# Patient Record
Sex: FEMALE | Race: WHITE | Employment: FULL TIME | URBAN - NONMETROPOLITAN AREA
[De-identification: names, ages, dates, MRNs, and addresses within clinical notes are randomized per-mention and may not be internally consistent; named-entity substitution may affect disease eponyms.]

---

## 2017-06-22 ENCOUNTER — OFFICE VISIT (OUTPATIENT)
Dept: FAMILY MEDICINE CLINIC | Age: 52
End: 2017-06-22

## 2017-06-22 VITALS
RESPIRATION RATE: 16 BRPM | WEIGHT: 119 LBS | SYSTOLIC BLOOD PRESSURE: 100 MMHG | HEIGHT: 66 IN | DIASTOLIC BLOOD PRESSURE: 70 MMHG | BODY MASS INDEX: 19.13 KG/M2 | HEART RATE: 72 BPM

## 2017-06-22 DIAGNOSIS — R53.83 FATIGUE, UNSPECIFIED TYPE: Primary | ICD-10-CM

## 2017-06-22 DIAGNOSIS — R63.4 WEIGHT LOSS: ICD-10-CM

## 2017-06-22 DIAGNOSIS — F41.9 ANXIETY: ICD-10-CM

## 2017-06-22 DIAGNOSIS — R53.1 WEAKNESS: ICD-10-CM

## 2017-06-22 DIAGNOSIS — H93.13 TINNITUS, BILATERAL: ICD-10-CM

## 2017-06-22 PROCEDURE — 99213 OFFICE O/P EST LOW 20 MIN: CPT | Performed by: FAMILY MEDICINE

## 2017-06-22 ASSESSMENT — PATIENT HEALTH QUESTIONNAIRE - PHQ9
SUM OF ALL RESPONSES TO PHQ QUESTIONS 1-9: 1
2. FEELING DOWN, DEPRESSED OR HOPELESS: 1
1. LITTLE INTEREST OR PLEASURE IN DOING THINGS: 0
SUM OF ALL RESPONSES TO PHQ9 QUESTIONS 1 & 2: 1

## 2017-06-25 ASSESSMENT — ENCOUNTER SYMPTOMS
DIARRHEA: 0
ABDOMINAL PAIN: 0
TROUBLE SWALLOWING: 0
CONSTIPATION: 0
SORE THROAT: 0
VOMITING: 0
WHEEZING: 0
EYE REDNESS: 0
RHINORRHEA: 0
SINUS PRESSURE: 0
COUGH: 0
EYE DISCHARGE: 0
NAUSEA: 0
SHORTNESS OF BREATH: 0

## 2017-12-12 ENCOUNTER — TELEPHONE (OUTPATIENT)
Dept: FAMILY MEDICINE CLINIC | Age: 52
End: 2017-12-12

## 2020-05-29 ENCOUNTER — OFFICE VISIT (OUTPATIENT)
Dept: FAMILY MEDICINE CLINIC | Age: 55
End: 2020-05-29
Payer: COMMERCIAL

## 2020-05-29 VITALS
SYSTOLIC BLOOD PRESSURE: 104 MMHG | DIASTOLIC BLOOD PRESSURE: 70 MMHG | HEIGHT: 66 IN | TEMPERATURE: 97.2 F | BODY MASS INDEX: 20.25 KG/M2 | WEIGHT: 126 LBS | RESPIRATION RATE: 18 BRPM | HEART RATE: 76 BPM | OXYGEN SATURATION: 98 %

## 2020-05-29 PROCEDURE — 99396 PREV VISIT EST AGE 40-64: CPT | Performed by: FAMILY MEDICINE

## 2020-05-29 RX ORDER — CLINDAMYCIN HYDROCHLORIDE 300 MG/1
CAPSULE ORAL
Qty: 18 CAPSULE | Refills: 0 | Status: SHIPPED | OUTPATIENT
Start: 2020-05-29 | End: 2022-09-13

## 2020-05-29 ASSESSMENT — ENCOUNTER SYMPTOMS
WHEEZING: 0
TROUBLE SWALLOWING: 0
DIARRHEA: 0
VOMITING: 0
ABDOMINAL PAIN: 0
EYE DISCHARGE: 0
RHINORRHEA: 0
EYE REDNESS: 0
SINUS PRESSURE: 0
SHORTNESS OF BREATH: 0
SORE THROAT: 0
CONSTIPATION: 0
NAUSEA: 0
COUGH: 0

## 2020-05-29 ASSESSMENT — PATIENT HEALTH QUESTIONNAIRE - PHQ9
SUM OF ALL RESPONSES TO PHQ QUESTIONS 1-9: 0
SUM OF ALL RESPONSES TO PHQ QUESTIONS 1-9: 0
2. FEELING DOWN, DEPRESSED OR HOPELESS: 0
SUM OF ALL RESPONSES TO PHQ9 QUESTIONS 1 & 2: 0
1. LITTLE INTEREST OR PLEASURE IN DOING THINGS: 0

## 2020-05-29 NOTE — PROGRESS NOTES
Platelets 811 132 - 733 k/uL    MPV 9.1 6.0 - 12.0 fL    Differential Type NOT REPORTED     WBC Morphology NOT REPORTED     RBC Morphology NOT REPORTED     Platelet Estimate NOT REPORTED     Seg Neutrophils 67 %    Lymphocytes 21 %    Monocytes 8 %    Eosinophils % 3 %    Basophils 1 %    Segs Absolute 3.90 1.8 - 7.7 k/uL    Absolute Lymph # 1.20 1.0 - 4.8 k/uL    Absolute Mono # 0.50 0.1 - 1.2 k/uL    Absolute Eos # 0.20 0.0 - 0.4 k/uL    Basophils Absolute 0.00 0.0 - 0.2 k/uL   TSH without Reflex   Result Value Ref Range    TSH 2.93 0.30 - 5.00 mIU/L   T4, Free   Result Value Ref Range    Thyroxine, Free 1.11 0.93 - 1.70 ng/dL   Comprehensive Metabolic Panel   Result Value Ref Range    Glucose 93 70 - 99 mg/dL    BUN 13 6 - 20 mg/dL    CREATININE 0.83 0.50 - 0.90 mg/dL    Bun/Cre Ratio 16 9 - 20    Calcium 9.9 8.6 - 10.4 mg/dL    Sodium 144 135 - 144 mmol/L    Potassium 4.5 3.7 - 5.3 mmol/L    Chloride 103 98 - 107 mmol/L    CO2 29 20 - 31 mmol/L    Anion Gap 12 9 - 17 mmol/L    Alkaline Phosphatase 75 35 - 104 U/L    ALT 14 5 - 33 U/L    AST 22 <32 U/L    Total Bilirubin 0.52 0.3 - 1.2 mg/dL    Total Protein 7.6 6.4 - 8.3 g/dL    Alb 4.9 3.5 - 5.2 g/dL    Albumin/Globulin Ratio 1.8 1.0 - 2.5    GFR Non-African American >60 >60 mL/min    GFR African American >60 >60 mL/min    GFR Comment          GFR Staging NOT REPORTED    Vitamin B12 & Folate   Result Value Ref Range    Vitamin B-12 732 211 - 946 pg/mL    Folate >20.0 >4.8 ng/mL   Ferritin   Result Value Ref Range    Ferritin 40 13 - 150 ug/L   Iron And TIBC   Result Value Ref Range    Iron 173 (H) 37 - 145 ug/dL    TIBC 379 250 - 450 ug/dL    Iron Saturation 46 20 - 55 %    UIBC 206 112 - 347 ug/dL      Other review of systems are as noted below. Preventative measures are reviewed today. See health maintenance section for complete preventative plan of care.     Did review patient's med list, allergies, social history, fam history, pmhx and pshx today as noted kg).    Physical Exam  Vitals signs and nursing note reviewed. Constitutional:       General: She is not in acute distress. Appearance: Normal appearance. She is well-developed. She is not diaphoretic. HENT:      Head: Normocephalic and atraumatic. Right Ear: Tympanic membrane, ear canal and external ear normal.      Left Ear: Tympanic membrane, ear canal and external ear normal.      Nose: Nose normal.      Mouth/Throat:      Mouth: Mucous membranes are moist.      Pharynx: Oropharynx is clear. No oropharyngeal exudate. Eyes:      General:         Right eye: No discharge. Left eye: No discharge. Conjunctiva/sclera: Conjunctivae normal.      Pupils: Pupils are equal, round, and reactive to light. Neck:      Musculoskeletal: Normal range of motion and neck supple. Thyroid: No thyromegaly. Cardiovascular:      Rate and Rhythm: Normal rate and regular rhythm. Heart sounds: Normal heart sounds. Pulmonary:      Effort: Pulmonary effort is normal.      Breath sounds: Normal breath sounds. No wheezing or rales. Abdominal:      General: Bowel sounds are normal. There is no distension. Palpations: Abdomen is soft. Tenderness: There is no abdominal tenderness. Lymphadenopathy:      Cervical: No cervical adenopathy. Skin:     General: Skin is warm and dry. Findings: No rash. Neurological:      Mental Status: She is alert and oriented to person, place, and time. Psychiatric:         Behavior: Behavior normal.         Thought Content: Thought content normal.         Judgment: Judgment normal.         No flowsheet data found.     Lab Results   Component Value Date    GLUCOSE 93 06/22/2017       The ASCVD Risk score (Nitish Multani., et al., 2013) failed to calculate for the following reasons:    Cannot find a previous HDL lab    Cannot find a previous total cholesterol lab    Immunization History   Administered Date(s) Administered    Influenza, Quadv, Recombinant,

## 2021-09-16 ENCOUNTER — TELEPHONE (OUTPATIENT)
Dept: FAMILY MEDICINE CLINIC | Age: 56
End: 2021-09-16

## 2021-09-16 NOTE — TELEPHONE ENCOUNTER
----- Message from Ritchie Amador sent at 9/16/2021 12:55 PM EDT -----  Subject: Message to Provider    QUESTIONS  Information for Provider? patient of Dr. Adrianna Angeles is calling to   request for her pcp to give her a call, she has a lot of questions and   needs to badly speak with her if possible to give her a call asap.  ---------------------------------------------------------------------------  --------------  [address]  What is the best way for the office to contact you? OK to leave message on   voicemail  Preferred Call Back Phone Number? [phone]  ---------------------------------------------------------------------------  --------------  SCRIPT ANSWERS  Relationship to Patient?  Self

## 2021-09-20 ENCOUNTER — OFFICE VISIT (OUTPATIENT)
Dept: FAMILY MEDICINE CLINIC | Age: 56
End: 2021-09-20
Payer: COMMERCIAL

## 2021-09-20 VITALS
HEIGHT: 66 IN | BODY MASS INDEX: 19.69 KG/M2 | TEMPERATURE: 96.2 F | WEIGHT: 122.5 LBS | SYSTOLIC BLOOD PRESSURE: 102 MMHG | DIASTOLIC BLOOD PRESSURE: 60 MMHG | HEART RATE: 87 BPM | OXYGEN SATURATION: 98 % | RESPIRATION RATE: 14 BRPM

## 2021-09-20 DIAGNOSIS — Z00.00 ROUTINE GENERAL MEDICAL EXAMINATION AT A HEALTH CARE FACILITY: ICD-10-CM

## 2021-09-20 DIAGNOSIS — U07.1 COVID-19: Primary | ICD-10-CM

## 2021-09-20 PROCEDURE — 99214 OFFICE O/P EST MOD 30 MIN: CPT | Performed by: FAMILY MEDICINE

## 2021-09-20 SDOH — ECONOMIC STABILITY: FOOD INSECURITY: WITHIN THE PAST 12 MONTHS, YOU WORRIED THAT YOUR FOOD WOULD RUN OUT BEFORE YOU GOT MONEY TO BUY MORE.: NEVER TRUE

## 2021-09-20 SDOH — ECONOMIC STABILITY: FOOD INSECURITY: WITHIN THE PAST 12 MONTHS, THE FOOD YOU BOUGHT JUST DIDN'T LAST AND YOU DIDN'T HAVE MONEY TO GET MORE.: NEVER TRUE

## 2021-09-20 ASSESSMENT — ENCOUNTER SYMPTOMS
ABDOMINAL PAIN: 0
VOMITING: 0
TROUBLE SWALLOWING: 0
NAUSEA: 0
COUGH: 0
SINUS PRESSURE: 0
EYE REDNESS: 0
CONSTIPATION: 0
EYE DISCHARGE: 0
WHEEZING: 0
SORE THROAT: 0
SHORTNESS OF BREATH: 0
DIARRHEA: 0
RHINORRHEA: 0

## 2021-09-20 ASSESSMENT — SOCIAL DETERMINANTS OF HEALTH (SDOH): HOW HARD IS IT FOR YOU TO PAY FOR THE VERY BASICS LIKE FOOD, HOUSING, MEDICAL CARE, AND HEATING?: NOT HARD AT ALL

## 2021-09-20 ASSESSMENT — PATIENT HEALTH QUESTIONNAIRE - PHQ9: DEPRESSION UNABLE TO ASSESS: PT REFUSES

## 2021-09-20 NOTE — PROGRESS NOTES
2021     Bennie Galvan (:  1965) is a 60 y.o. female, here for evaluation of the following medical concerns:    HPI    Patient comes in today for assessment after acute COVID-19 infection. Patient did test positive for COVID-19 on . She does work as a patient care tech in the hospital setting. She did get Regeneron infusion and did have significant improvement in her symptoms overall. She was having significant generalized body aches and fatigue and congestion. After getting the infusion her symptoms improved significantly. She does state that she is just not sure she is ready go back to work. She does still get tired throughout the day and has a very demanding job. We did talk about the stress of her job duties and she is not sure if she wants to continue in the healthcare field. I did discuss with her considering different options and she is considering her options at this time. Patient otherwise has no other acute issues. I did make her aware that is been a while since she has had any routine screening lab work so may want to get updated lab work for screening purposes and she is agreeable with this plan. Did review patient's med list, allergies, social history, fam history, pmhx and pshx today as noted in the record. Review of Systems   Constitutional: Positive for fatigue (intermittent since acute covid). Negative for chills and fever. HENT: Negative for congestion, ear pain, postnasal drip, rhinorrhea, sinus pressure, sore throat and trouble swallowing. Eyes: Negative for discharge and redness. Respiratory: Negative for cough, shortness of breath and wheezing. Cardiovascular: Negative for chest pain. Gastrointestinal: Negative for abdominal pain, constipation, diarrhea, nausea and vomiting. Genitourinary: Negative for dysuria, flank pain, frequency and urgency. Musculoskeletal: Negative for arthralgias, myalgias and neck pain.    Skin: Negative for rash and wound. Allergic/Immunologic: Negative for environmental allergies. Neurological: Negative for dizziness, weakness, light-headedness and headaches. Hematological: Negative for adenopathy. Psychiatric/Behavioral: Negative. Prior to Visit Medications    Medication Sig Taking? Authorizing Provider   Cyanocobalamin (VITAMIN B-12 PO) Take by mouth daily  Historical Provider, MD   clindamycin (CLEOCIN) 300 MG capsule Take 2 tabs 30 minutes prior to dental procedure. Then after dental extraction take 1 pill bid for 1 week  Aneta Alcocer,         Social History     Tobacco Use    Smoking status: Never Smoker    Smokeless tobacco: Never Used   Substance Use Topics    Alcohol use: No        There were no vitals filed for this visit. Estimated body mass index is 20.34 kg/m² as calculated from the following:    Height as of 5/29/20: 5' 6\" (1.676 m). Weight as of 5/29/20: 126 lb (57.2 kg). Physical Exam  Vitals and nursing note reviewed. Constitutional:       General: She is not in acute distress. Appearance: Normal appearance. She is well-developed. She is not diaphoretic. HENT:      Head: Normocephalic and atraumatic. Right Ear: Tympanic membrane, ear canal and external ear normal.      Left Ear: Tympanic membrane, ear canal and external ear normal.      Nose: Nose normal.      Mouth/Throat:      Mouth: Mucous membranes are moist.      Pharynx: Oropharynx is clear. No oropharyngeal exudate. Eyes:      General:         Right eye: No discharge. Left eye: No discharge. Conjunctiva/sclera: Conjunctivae normal.      Pupils: Pupils are equal, round, and reactive to light. Neck:      Thyroid: No thyromegaly. Cardiovascular:      Rate and Rhythm: Normal rate and regular rhythm. Heart sounds: Normal heart sounds. Pulmonary:      Effort: Pulmonary effort is normal.      Breath sounds: Normal breath sounds. No wheezing or rales.    Abdominal:      General: Bowel sounds are normal. There is no distension. Palpations: Abdomen is soft. Tenderness: There is no abdominal tenderness. Musculoskeletal:      Cervical back: Normal range of motion and neck supple. Lymphadenopathy:      Cervical: No cervical adenopathy. Skin:     General: Skin is warm and dry. Findings: No rash. Neurological:      Mental Status: She is alert and oriented to person, place, and time. Psychiatric:         Behavior: Behavior normal.         Thought Content: Thought content normal.         Judgment: Judgment normal.         ASSESSMENT/PLAN:  Encounter Diagnoses   Name Primary?  COVID-19 Yes    Routine general medical examination at a health care facility      No orders of the defined types were placed in this encounter. Orders Placed This Encounter   Procedures    Comprehensive Metabolic Panel     Standing Status:   Future     Standing Expiration Date:   9/20/2022    Lipid Panel     Standing Status:   Future     Standing Expiration Date:   9/20/2022     Order Specific Question:   Is Patient Fasting?/# of Hours     Answer:   12    CBC Auto Differential     Standing Status:   Future     Standing Expiration Date:   9/20/2022    TSH without Reflex     Standing Status:   Future     Standing Expiration Date:   9/20/2022     Did give her a work note to be off work for an additional week duration. This will allow for her acute infectious symptoms to improve. Should get plenty of rest and maintain adequate fluid hydration. Patient does follow healthy dietary intake. Patient is to get routine screening lab work as ordered. Will make further recommendations based on testing reports. Patient is to return to my office annually for routine general physical exam or sooner if any further problems or symptoms arise.     (Please note that portions of this note were completed with a voice recognition program. Efforts were made to edit the dictations but occasionally words are mis-transcribed.)        No follow-ups on file. An electronic signature was used to authenticate this note.     --Adrianna Angeles DO on 9/20/2021 at 7:37 AM

## 2021-09-20 NOTE — LETTER
[facility] department of [place] [address]  Phone: [phone]  Fax: Mick Macias,       September 20, 2021    Patient:   Lanette Montgomery  Date of Birth   1965  Date of visit   9/20/2021        To Whom it May Concern:      Lanette Montgomery was seen in my clinic on 9/20/2021. Please excuse from work 9/20/21-9/26/21 due to acute medical illness. May return to regular work duties on 9/27/21. If you have any questions or concerns, please don't hesitate to call.       Sincerely,      Ambar Bowie DO

## 2021-10-28 ENCOUNTER — TELEPHONE (OUTPATIENT)
Dept: FAMILY MEDICINE CLINIC | Age: 56
End: 2021-10-28

## 2021-10-28 NOTE — TELEPHONE ENCOUNTER
----- Message from Misha So sent at 10/28/2021  9:03 AM EDT -----  Subject: Appointment Request    Reason for Call: Routine Existing Condition Follow Up    QUESTIONS  Type of Appointment? Established Patient  Reason for appointment request? No appointments available during search  Additional Information for Provider? Patient called to reschedule   appointment. patient is available Nov 15-Nov 17. Patient is also request   lab for covid- antibiotic levels. Patient is not able to see   grand-children until tested. Patient is also stating she can also be   available anytime to come  ---------------------------------------------------------------------------  --------------  [facility]  What is the best way for the office to contact you? OK to leave message on   voicemail  Preferred Call Back Phone Number? [phone]  ---------------------------------------------------------------------------  --------------  SCRIPT ANSWERS  Relationship to Patient? Self  Have your symptoms changed? No  Have you been diagnosed with, awaiting test results for, or told that you   are suspected of having COVID-19 (Coronavirus)? (If patient has tested   negative or was tested as a requirement for work, school, or travel and   not based on symptoms, answer no)? No  Within the past two weeks have you developed any of the following symptoms   (answer no if symptoms have been present longer than 2 weeks or began   more than 2 weeks ago)? Fever or Chills, Cough, Shortness of breath or   difficulty breathing, Loss of taste or smell, Sore throat, Nasal   congestion, Sneezing or runny nose, Fatigue or generalized body aches   (answer no if pain is specific to a body part e.g. back pain), Diarrhea,   Headache? No  Have you had close contact with someone with COVID-19 in the last 14 days? No  (Service Expert  click yes below to proceed with [email or web address] As Usual   Scheduling)?  Yes

## 2021-11-11 ENCOUNTER — TELEPHONE (OUTPATIENT)
Dept: FAMILY MEDICINE CLINIC | Age: 56
End: 2021-11-11

## 2021-11-11 DIAGNOSIS — Z78.9 UNKNOWN STATUS OF IMMUNITY TO COVID-19 VIRUS: Primary | ICD-10-CM

## 2021-11-11 NOTE — TELEPHONE ENCOUNTER
----- Message from Festus Trevino sent at 11/11/2021  9:16 AM EST -----  Subject: Message to Provider    QUESTIONS  Information for Provider? Pt called she she had covid infusion on Sept 11th and she is asking on Dec 11th withh be the 90th day for covid   infusion and she is wanting to know if she has to get her levels checked,   and which vaccine would be better for her please follow up   ---------------------------------------------------------------------------  --------------  [address]  What is the best way for the office to contact you? OK to leave message on   voicemail  Preferred Call Back Phone Number? [phone]  ---------------------------------------------------------------------------  --------------  SCRIPT ANSWERS  Relationship to Patient?  Self

## 2021-11-17 ENCOUNTER — TELEPHONE (OUTPATIENT)
Dept: FAMILY MEDICINE CLINIC | Age: 56
End: 2021-11-17

## 2021-12-09 ENCOUNTER — TELEPHONE (OUTPATIENT)
Dept: FAMILY MEDICINE CLINIC | Age: 56
End: 2021-12-09

## 2021-12-09 NOTE — TELEPHONE ENCOUNTER
No, doesn't necessarily need to retest unless she is curious if she still has antibodies. Would get the vaccine either way.

## 2021-12-09 NOTE — TELEPHONE ENCOUNTER
Pt calling stating she had the antibody test and tested positive for Igg due to pt have pt having had the infusion, pt questions if she needs to be retested before getting the covid vaccine, once it has been 90 days, please advise.

## 2021-12-17 ENCOUNTER — TELEPHONE (OUTPATIENT)
Dept: FAMILY MEDICINE CLINIC | Age: 56
End: 2021-12-17

## 2021-12-17 NOTE — TELEPHONE ENCOUNTER
Spoke with patient and reminded her to complete fasting labs that were ordered 09/20/2021. Patient states she is not going to complete all of them as she can not fast 12 hours. Advised patient to fast as long as she was able and that would be ok. She states I am not going to do that because my labs will be off then and then you will document in my medical record I have things wrong that I do not! She states she is willing to do the TSH and CBC and will do them at [place] to mail her the orders.

## 2022-01-26 ENCOUNTER — TELEPHONE (OUTPATIENT)
Dept: FAMILY MEDICINE CLINIC | Age: 57
End: 2022-01-26

## 2022-03-01 ENCOUNTER — OFFICE VISIT (OUTPATIENT)
Dept: FAMILY MEDICINE CLINIC | Age: 57
End: 2022-03-01
Payer: COMMERCIAL

## 2022-03-01 ENCOUNTER — HOSPITAL ENCOUNTER (OUTPATIENT)
Dept: LAB | Age: 57
Discharge: HOME | End: 2022-03-01
Payer: COMMERCIAL

## 2022-03-01 VITALS
HEIGHT: 66 IN | OXYGEN SATURATION: 97 % | WEIGHT: 122 LBS | BODY MASS INDEX: 19.61 KG/M2 | RESPIRATION RATE: 12 BRPM | HEART RATE: 68 BPM | SYSTOLIC BLOOD PRESSURE: 114 MMHG | DIASTOLIC BLOOD PRESSURE: 78 MMHG

## 2022-03-01 DIAGNOSIS — Z78.9 UNKNOWN STATUS OF IMMUNITY TO COVID-19 VIRUS: ICD-10-CM

## 2022-03-01 DIAGNOSIS — T50.B95A ADVERSE REACTION TO COVID-19 VACCINE: ICD-10-CM

## 2022-03-01 DIAGNOSIS — Z00.00 ROUTINE GENERAL MEDICAL EXAMINATION AT A HEALTH CARE FACILITY: ICD-10-CM

## 2022-03-01 DIAGNOSIS — Z00.00 ROUTINE GENERAL MEDICAL EXAMINATION AT A HEALTH CARE FACILITY: Primary | ICD-10-CM

## 2022-03-01 LAB
ABSOLUTE EOS #: 0.33 K/UL (ref 0–0.44)
ABSOLUTE IMMATURE GRANULOCYTE: <0.03 K/UL (ref 0–0.3)
ABSOLUTE LYMPH #: 2.07 K/UL (ref 1.1–3.7)
ABSOLUTE MONO #: 0.48 K/UL (ref 0.1–1.2)
ALBUMIN SERPL-MCNC: 4.5 G/DL (ref 3.5–5.2)
ALBUMIN/GLOBULIN RATIO: 1.6 (ref 1–2.5)
ALP BLD-CCNC: 89 U/L (ref 35–104)
ALT SERPL-CCNC: 16 U/L (ref 5–33)
ANION GAP SERPL CALCULATED.3IONS-SCNC: 8 MMOL/L (ref 9–17)
AST SERPL-CCNC: 23 U/L
BASOPHILS # BLD: 1 % (ref 0–2)
BASOPHILS ABSOLUTE: 0.04 K/UL (ref 0–0.2)
BILIRUB SERPL-MCNC: 0.35 MG/DL (ref 0.3–1.2)
BUN BLDV-MCNC: 14 MG/DL (ref 6–20)
BUN/CREAT BLD: 15 (ref 9–20)
CALCIUM SERPL-MCNC: 9.9 MG/DL (ref 8.6–10.4)
CHLORIDE BLD-SCNC: 103 MMOL/L (ref 98–107)
CO2: 31 MMOL/L (ref 20–31)
CREAT SERPL-MCNC: 0.91 MG/DL (ref 0.5–0.9)
EOSINOPHILS RELATIVE PERCENT: 7 % (ref 1–4)
GFR AFRICAN AMERICAN: >60 ML/MIN
GFR NON-AFRICAN AMERICAN: >60 ML/MIN
GFR SERPL CREATININE-BSD FRML MDRD: ABNORMAL ML/MIN/{1.73_M2}
GLUCOSE BLD-MCNC: 94 MG/DL (ref 70–99)
HCT VFR BLD CALC: 39.8 % (ref 36.3–47.1)
HEMOGLOBIN: 13.1 G/DL (ref 11.9–15.1)
IMMATURE GRANULOCYTES: 0 %
LYMPHOCYTES # BLD: 44 % (ref 24–43)
MCH RBC QN AUTO: 31.4 PG (ref 25.2–33.5)
MCHC RBC AUTO-ENTMCNC: 32.9 G/DL (ref 25.2–33.5)
MCV RBC AUTO: 95.4 FL (ref 82.6–102.9)
MONOCYTES # BLD: 10 % (ref 3–12)
NRBC AUTOMATED: 0 PER 100 WBC
PDW BLD-RTO: 11.5 % (ref 11.8–14.4)
PLATELET # BLD: 192 K/UL (ref 138–453)
PMV BLD AUTO: 11.3 FL (ref 8.1–13.5)
POTASSIUM SERPL-SCNC: 3.9 MMOL/L (ref 3.7–5.3)
RBC # BLD: 4.17 M/UL (ref 3.95–5.11)
SARS-COV-2 ANTIBODY, TOTAL: POSITIVE
SEG NEUTROPHILS: 38 % (ref 36–65)
SEGMENTED NEUTROPHILS ABSOLUTE COUNT: 1.78 K/UL (ref 1.5–8.1)
SODIUM BLD-SCNC: 142 MMOL/L (ref 135–144)
TOTAL PROTEIN: 7.3 G/DL (ref 6.4–8.3)
WBC # BLD: 4.7 K/UL (ref 3.5–11.3)

## 2022-03-01 PROCEDURE — 86769 SARS-COV-2 COVID-19 ANTIBODY: CPT

## 2022-03-01 PROCEDURE — 99396 PREV VISIT EST AGE 40-64: CPT | Performed by: FAMILY MEDICINE

## 2022-03-01 PROCEDURE — 36415 COLL VENOUS BLD VENIPUNCTURE: CPT

## 2022-03-01 PROCEDURE — 99211 OFF/OP EST MAY X REQ PHY/QHP: CPT | Performed by: FAMILY MEDICINE

## 2022-03-01 PROCEDURE — 80053 COMPREHEN METABOLIC PANEL: CPT

## 2022-03-01 PROCEDURE — 85025 COMPLETE CBC W/AUTO DIFF WBC: CPT

## 2022-03-01 ASSESSMENT — ENCOUNTER SYMPTOMS
EYE REDNESS: 0
SINUS PRESSURE: 0
SORE THROAT: 0
EYE DISCHARGE: 0
NAUSEA: 0
TROUBLE SWALLOWING: 0
COUGH: 0
RHINORRHEA: 0
VOMITING: 0
WHEEZING: 0
CONSTIPATION: 0
DIARRHEA: 0
SHORTNESS OF BREATH: 0
ABDOMINAL PAIN: 0

## 2022-03-01 ASSESSMENT — PATIENT HEALTH QUESTIONNAIRE - PHQ9
SUM OF ALL RESPONSES TO PHQ QUESTIONS 1-9: 1
SUM OF ALL RESPONSES TO PHQ QUESTIONS 1-9: 1
SUM OF ALL RESPONSES TO PHQ9 QUESTIONS 1 & 2: 1
1. LITTLE INTEREST OR PLEASURE IN DOING THINGS: 0
SUM OF ALL RESPONSES TO PHQ QUESTIONS 1-9: 1
SUM OF ALL RESPONSES TO PHQ QUESTIONS 1-9: 1
2. FEELING DOWN, DEPRESSED OR HOPELESS: 1

## 2022-03-01 NOTE — PROGRESS NOTES
3/1/2022     Kiesha Carrasco (:  1965) is a 60 y.o. female, here for evaluation of the following medical concerns:    HPI  Patient comes in today for routine general physical exam and to discuss reaction to the Covid vaccine. Patient states that she did receive her first COVID vaccine and did have a very severe headache afterwards. She states since getting her COVID vaccine she has had this recurrent headache in the middle of her forehead that feels like an active splitting her head into. She gets this at least once a week. She is concerned about getting her second dose of the vaccine as she is afraid this is going to worsen. She did have COVID last fall. Did state that her workplace would need a written note for exemption of getting the second vaccine. She also notes that she has been having floaters in her right eye and she was concerned about that. She has not had any loss of vision or significant visual change but periodically notes something floating in her vision. Otherwise with regards to her overall health she is not having any acute medical concerns. She is due for some updated screening lab work which we can order at this time. Does try to follow healthy diet and stays physically active. No other acute medical concerns at this time. Did review patient's med list, allergies, social history, fam history, pmhx and pshx today as noted in the record. Review of Systems   Constitutional: Negative for chills, fatigue and fever. HENT: Negative for congestion, ear pain, postnasal drip, rhinorrhea, sinus pressure, sore throat and trouble swallowing. Eyes: Positive for visual disturbance (floaters in vision). Negative for discharge and redness. Respiratory: Negative for cough, shortness of breath and wheezing. Cardiovascular: Negative for chest pain. Gastrointestinal: Negative for abdominal pain, constipation, diarrhea, nausea and vomiting.    Genitourinary: Negative for dysuria, flank pain, frequency and urgency. Musculoskeletal: Negative for arthralgias, myalgias and neck pain. Skin: Negative for rash and wound. Allergic/Immunologic: Negative for environmental allergies. Neurological: Positive for headaches. Negative for dizziness, weakness and light-headedness. Hematological: Negative for adenopathy. Psychiatric/Behavioral: Negative. Prior to Visit Medications    Medication Sig Taking? Authorizing Provider   Cyanocobalamin (VITAMIN B-12 PO) Take by mouth daily  Patient not taking: Reported on 9/20/2021  Historical Provider, MD   clindamycin (CLEOCIN) 300 MG capsule Take 2 tabs 30 minutes prior to dental procedure. Then after dental extraction take 1 pill bid for 1 week  John Rodrigez, DO        Social History     Tobacco Use    Smoking status: Never Smoker    Smokeless tobacco: Never Used   Substance Use Topics    Alcohol use: No        There were no vitals filed for this visit. Estimated body mass index is 19.77 kg/m² as calculated from the following:    Height as of 9/20/21: 5' 6\" (1.676 m). Weight as of 9/20/21: 122 lb 8 oz (55.6 kg). Physical Exam  Vitals and nursing note reviewed. Constitutional:       General: She is not in acute distress. Appearance: Normal appearance. She is well-developed. She is not diaphoretic. HENT:      Head: Normocephalic and atraumatic. Right Ear: Tympanic membrane, ear canal and external ear normal.      Left Ear: Tympanic membrane, ear canal and external ear normal.      Nose: Nose normal.      Mouth/Throat:      Mouth: Mucous membranes are moist.      Pharynx: Oropharynx is clear. No oropharyngeal exudate. Eyes:      General:         Right eye: No discharge. Left eye: No discharge. Conjunctiva/sclera: Conjunctivae normal.      Pupils: Pupils are equal, round, and reactive to light. Comments: Attempted to do fundoscopic evaluation which was limited due to non dilated pupils.   Do not appreciate any acute abnormality   Neck:      Thyroid: No thyromegaly. Cardiovascular:      Rate and Rhythm: Normal rate and regular rhythm. Heart sounds: Normal heart sounds. Pulmonary:      Effort: Pulmonary effort is normal.      Breath sounds: Normal breath sounds. No wheezing or rales. Abdominal:      General: Bowel sounds are normal. There is no distension. Palpations: Abdomen is soft. Tenderness: There is no abdominal tenderness. Musculoskeletal:      Cervical back: Normal range of motion and neck supple. Lymphadenopathy:      Cervical: No cervical adenopathy. Skin:     General: Skin is warm and dry. Findings: No rash. Neurological:      Mental Status: She is alert and oriented to person, place, and time. Psychiatric:         Behavior: Behavior normal.         Thought Content: Thought content normal.         Judgment: Judgment normal.         ASSESSMENT/PLAN:    Encounter Diagnoses   Name Primary?  Routine general medical examination at a health care facility Yes    Adverse reaction to COVID-19 vaccine     Unknown status of immunity to COVID-19 virus      No orders of the defined types were placed in this encounter. Orders Placed This Encounter   Procedures    Comprehensive Metabolic Panel     Standing Status:   Future     Standing Expiration Date:   3/1/2023    CBC with Auto Differential     Standing Status:   Future     Standing Expiration Date:   3/1/2023    Lipid Panel     Standing Status:   Future     Standing Expiration Date:   3/1/2023     Order Specific Question:   Is Patient Fasting?/# of Hours     Answer:   12    Covid-19, Antibody, Total     Standing Status:   Future     Standing Expiration Date:   3/1/2023     Scheduling Instructions:      CDC does not recommend using antibody testing to diagnose acute infection. It is recommended to use a direct viral detection test to diagnose acute infection.  (COVID-19 [facility] [id number])            Antibody tests are not 100% accurate, and some false-positive results or false-negative results may occur. A positive result may not ensure immunity from reinfection. Per CDC, positive or negative results do not confirm whether a patient is able to spread the virus that causes COVID-19     Order Specific Question:   Symptomatic for COVID-19 as defined by CDC? Answer:   No     Order Specific Question:   Date of Symptom Onset     Answer:   N/A     Order Specific Question:   Hospitalized for COVID-19? Answer:   No     Order Specific Question:   Admitted to ICU for COVID-19? Answer:   No     Order Specific Question:   Employed in healthcare setting? Answer:   No     Order Specific Question:   Resident in a congregate (group) care setting? Answer:   No     Order Specific Question:   Pregnant? Answer:   No     Order Specific Question:   Previously tested for COVID-19? Answer:   Yes     Did write patient and exemption for her COVID vaccine second dose as she did have significant reaction to the first dose. Patient is to have lab work done as ordered. Will make further recommendations based on testing reports. Patient is to continue to follow healthy dietary intake and routine exercise for optimal health. Patient is to return to my office annually for routine general physical exam or sooner if any further problems or symptoms arise. (Please note that portions of this note were completed with a voice recognition program. Efforts were made to edit the dictations but occasionally words are mis-transcribed.)      No follow-ups on file. An electronic signature was used to authenticate this note.     --Dameon Christy, DO on 3/1/2022 at 7:21 AM

## 2022-03-01 NOTE — PATIENT INSTRUCTIONS
Appointment on 06/22/2017   Component Date Value Ref Range Status    WBC 06/22/2017 5.8  3.5 - 11.0 k/uL Final    RBC 06/22/2017 4.62  4.0 - 5.2 m/uL Final    Hemoglobin 06/22/2017 14.1  12.0 - 16.0 g/dL Final    Hematocrit 06/22/2017 43.5  36 - 46 % Final    MCV 06/22/2017 94.1  80 - 100 fL Final    MCH 06/22/2017 30.5  26 - 34 pg Final    MCHC 06/22/2017 32.4  31 - 37 g/dL Final    RDW 06/22/2017 12.7  11.0 - 14.5 % Final    Platelets 77/08/3955 214  140 - 450 k/uL Final    MPV 06/22/2017 9.1  6.0 - 12.0 fL Final    Differential Type 06/22/2017 NOT REPORTED   Final    WBC Morphology 06/22/2017 NOT REPORTED   Final    RBC Morphology 06/22/2017 NOT REPORTED   Final    Platelet Estimate 20/38/6884 NOT REPORTED   Final    Seg Neutrophils 06/22/2017 67  % Final    Lymphocytes 06/22/2017 21  % Final    Monocytes 06/22/2017 8  % Final    Eosinophils % 06/22/2017 3  % Final    Basophils 06/22/2017 1  % Final    Segs Absolute 06/22/2017 3.90  1.8 - 7.7 k/uL Final    Absolute Lymph # 06/22/2017 1.20  1.0 - 4.8 k/uL Final    Absolute Mono # 06/22/2017 0.50  0.1 - 1.2 k/uL Final    Absolute Eos # 06/22/2017 0.20  0.0 - 0.4 k/uL Final    Basophils Absolute 06/22/2017 0.00  0.0 - 0.2 k/uL Final    Comment: Performed at [facility] [address], [address]   [phone]      TSH 06/22/2017 2.93  0.30 - 5.00 mIU/L Final    Comment: Performed at [facility] [address], [address]   [phone]      Thyroxine, Free 06/22/2017 1.11  0.93 - 1.70 ng/dL Final    Comment: Performed at [facility] [address], [address]   [phone]      Glucose 06/22/2017 93  70 - 99 mg/dL Final    BUN 06/22/2017 13  6 - 20 mg/dL Final    CREATININE 06/22/2017 0.83  0.50 - 0.90 mg/dL Final    Bun/Cre Ratio 06/22/2017 16  9 - 20 Final    Calcium 06/22/2017 9.9  8.6 - 10.4 mg/dL Final    Sodium 06/22/2017 144  135 - 144 mmol/L Final    Potassium 06/22/2017 4.5  3.7 - 5.3 mmol/L Final    Chloride 06/22/2017 103  98 - 107 mmol/L Final    CO2 06/22/2017 29  20 - 31 mmol/L Final    Anion Gap 06/22/2017 12  9 - 17 mmol/L Final    Alkaline Phosphatase 06/22/2017 75  35 - 104 U/L Final    ALT 06/22/2017 14  5 - 33 U/L Final    AST 06/22/2017 22  <32 U/L Final    Total Bilirubin 06/22/2017 0.52  0.3 - 1.2 mg/dL Final    Total Protein 06/22/2017 7.6  6.4 - 8.3 g/dL Final    Albumin 06/22/2017 4.9  3.5 - 5.2 g/dL Final    Albumin/Globulin Ratio 06/22/2017 1.8  1.0 - 2.5 Final    GFR Non- 06/22/2017 >60  >60 mL/min Final    GFR  06/22/2017 >60  >60 mL/min Final    GFR Comment 06/22/2017        Final    Comment: Average GFR for 52-63 years old:   80 mL/min/1.73sq m  Chronic Kidney Disease:   <60 mL/min/1.73sq m  Kidney failure:   <15 mL/min/1.73sq m              eGFR calculated using average adult body mass. Additional eGFR calculator   available at:        [employer]        Performed at [facility] [address], [address]   [phone]      GFR Staging 06/22/2017 NOT REPORTED   Final    Vitamin B-12 06/22/2017 732  211 - 946 pg/mL Final    Folate 06/22/2017 >20.0  >4.8 ng/mL Final    Performed at [facility], [address] [place] [address] [phone]    Ferritin 06/22/2017 40  13 - 150 ug/L Final    Comment: Performed at [facility] [address], [address]   [phone]      Iron 06/22/2017 173* 37 - 145 ug/dL Final    TIBC 06/22/2017 379  250 - 450 ug/dL Final    Iron Saturation 06/22/2017 46  20 - 55 % Final    UIBC 06/22/2017 206  112 - 347 ug/dL Final    Comment: Performed at [facility] [address], [address]   [phone]. [facility]

## 2022-03-01 NOTE — PROGRESS NOTES
Patient declines HIV and Hep C screenings. She declines the shingles and tetanus vaccines today. She would like to discuss the 2nd Covid vaccine with the doctor today due to side effects with the 1st vaccine. She declines a mammogram and colonoscopy today.

## 2022-03-01 NOTE — LETTER
[facility] department of [place] [address]  Phone: [phone]  Fax: DO Love        2022      To Whom It May Concern,    I am writing with regards to my patient Safia Daigle ( 1965). Due to a reaction to her initial COVID vaccination, I do recommend that she avoid further COVID vaccines. Please allow her to be exempt from this requirement. Should you have any further questions or concerns, please feel free to contact my office.       Sincerely,        Nilson Miranda DO

## 2022-06-23 ENCOUNTER — HOSPITAL ENCOUNTER (OUTPATIENT)
Age: 57
Setting detail: SPECIMEN
Discharge: HOME | End: 2022-06-23
Payer: COMMERCIAL

## 2022-06-23 ENCOUNTER — OFFICE VISIT (OUTPATIENT)
Dept: PRIMARY CARE CLINIC | Age: 57
End: 2022-06-23
Payer: COMMERCIAL

## 2022-06-23 VITALS
HEIGHT: 66 IN | WEIGHT: 120 LBS | SYSTOLIC BLOOD PRESSURE: 100 MMHG | HEART RATE: 79 BPM | DIASTOLIC BLOOD PRESSURE: 64 MMHG | TEMPERATURE: 97.9 F | OXYGEN SATURATION: 98 % | BODY MASS INDEX: 19.29 KG/M2

## 2022-06-23 DIAGNOSIS — R30.9 PAINFUL URINATION: ICD-10-CM

## 2022-06-23 DIAGNOSIS — N89.8 VAGINAL DISCHARGE: ICD-10-CM

## 2022-06-23 DIAGNOSIS — R30.9 PAINFUL URINATION: Primary | ICD-10-CM

## 2022-06-23 LAB
-: NORMAL
BACTERIA: NORMAL
BILIRUBIN URINE: NEGATIVE
EPITHELIAL CELLS UA: NORMAL /HPF (ref 0–5)
GLUCOSE URINE: NEGATIVE
KETONES, URINE: NEGATIVE
LEUKOCYTE ESTERASE, URINE: ABNORMAL
NITRITE, URINE: NEGATIVE
PH UA: 5.5 (ref 5–6)
PROTEIN UA: NEGATIVE
RBC UA: NORMAL /HPF (ref 0–4)
SPECIFIC GRAVITY UA: 1 (ref 1.01–1.02)
URINE HGB: ABNORMAL
UROBILINOGEN, URINE: NORMAL
WBC UA: NORMAL /HPF (ref 0–4)

## 2022-06-23 PROCEDURE — 87660 TRICHOMONAS VAGIN DIR PROBE: CPT

## 2022-06-23 PROCEDURE — 81001 URINALYSIS AUTO W/SCOPE: CPT

## 2022-06-23 PROCEDURE — 87591 N.GONORRHOEAE DNA AMP PROB: CPT

## 2022-06-23 PROCEDURE — 87480 CANDIDA DNA DIR PROBE: CPT

## 2022-06-23 PROCEDURE — 87510 GARDNER VAG DNA DIR PROBE: CPT

## 2022-06-23 PROCEDURE — 87491 CHLMYD TRACH DNA AMP PROBE: CPT

## 2022-06-23 PROCEDURE — 99213 OFFICE O/P EST LOW 20 MIN: CPT | Performed by: NURSE PRACTITIONER

## 2022-06-23 ASSESSMENT — ENCOUNTER SYMPTOMS
VOMITING: 0
NAUSEA: 0
RESPIRATORY NEGATIVE: 1

## 2022-06-23 ASSESSMENT — PATIENT HEALTH QUESTIONNAIRE - PHQ9
SUM OF ALL RESPONSES TO PHQ QUESTIONS 1-9: 0
2. FEELING DOWN, DEPRESSED OR HOPELESS: 0
SUM OF ALL RESPONSES TO PHQ QUESTIONS 1-9: 0
SUM OF ALL RESPONSES TO PHQ9 QUESTIONS 1 & 2: 0
SUM OF ALL RESPONSES TO PHQ QUESTIONS 1-9: 0
1. LITTLE INTEREST OR PLEASURE IN DOING THINGS: 0
SUM OF ALL RESPONSES TO PHQ QUESTIONS 1-9: 0

## 2022-06-23 NOTE — PROGRESS NOTES
[facility]             [address], [address]                        Telephone [phone]             Fax [phone]     Meenu Hartmann  1965  OHI:[id number]   Date of visit:  6/23/2022    Subjective:    Meenu Hartmann is a 59 y.o.  female who presents to [facility] today (6/23/2022) for evaluation of:    Chief Complaint   Patient presents with    Urinary Frequency     urgency,watery with blood tinged discharge,burning. No pap smear for 25 yrs. sx began 3 days ago. Urinary Frequency   This is a new problem. The current episode started in the past 7 days (06/20/22). The problem occurs every urination. The problem has been gradually worsening. The quality of the pain is described as burning. The pain is at a severity of 5/10. There has been no fever. She is not sexually active. There is no history of pyelonephritis. Associated symptoms include a discharge (pink watery discharge X 3 days), frequency and urgency. Pertinent negatives include no flank pain, hematuria, nausea, possible pregnancy or vomiting. She has tried nothing for the symptoms. The treatment provided no relief. History of hysterectomy. She has the following problem list:  There is no problem list on file for this patient. Current medications are:  Current Outpatient Medications   Medication Sig Dispense Refill    Multiple Vitamins-Minerals (THRIVE FOR LIFE WOMENS PO) Take by mouth      Cyanocobalamin (VITAMIN B-12 PO) Take by mouth daily       clindamycin (CLEOCIN) 300 MG capsule Take 2 tabs 30 minutes prior to dental procedure. Then after dental extraction take 1 pill bid for 1 week (Patient not taking: Reported on 3/1/2022) 18 capsule 0     No current facility-administered medications for this visit.         She is allergic to aciphex [rabeprazole sodium], biaxin [clarithromycin], buspirone, nizatidine, sudafed [pseudoephedrine], sumatriptan, amoxicillin, amoxicillin-pot clavulanate, and ciprofloxacin. .    She  reports that she has never smoked. She has never used smokeless tobacco.      Objective:    Vitals:    06/23/22 1438   BP: 100/64   Pulse: 79   Temp: 97.9 °F (36.6 °C)   TempSrc: Tympanic   SpO2: 98%   Weight: 120 lb (54.4 kg)   Height: 5' 6\" (1.676 m)     Body mass index is 19.37 kg/m². Review of Systems   Constitutional: Negative. Respiratory: Negative. Cardiovascular: Negative. Gastrointestinal: Negative for nausea and vomiting. Genitourinary: Positive for dysuria, frequency, urgency and vaginal discharge (pink, watery). Negative for flank pain, hematuria and pelvic pain. Physical Exam  Vitals and nursing note reviewed. Exam conducted with a chaperone present (Radha ERAZO). Constitutional:       Appearance: She is well-developed. HENT:      Head: Normocephalic. Eyes:      Pupils: Pupils are equal, round, and reactive to light. Cardiovascular:      Rate and Rhythm: Normal rate and regular rhythm. Heart sounds: Normal heart sounds. Pulmonary:      Effort: Pulmonary effort is normal.      Breath sounds: Normal breath sounds and air entry. Abdominal:      General: Abdomen is flat. Bowel sounds are normal.      Palpations: Abdomen is soft. Tenderness: There is no abdominal tenderness. Genitourinary:     Exam position: Supine. Vagina: Vaginal discharge (thin yellow), erythema and tenderness (mild with insertion of speculum) present. Uterus: Absent. Adnexa: Right adnexa normal and left adnexa normal.      Rectum: Normal.   Musculoskeletal:      Cervical back: Normal range of motion and neck supple. Skin:     General: Skin is warm and dry. Neurological:      Mental Status: She is alert and oriented to person, place, and time. Psychiatric:         Behavior: Behavior normal.         Thought Content:  Thought content normal.       Assessment and Plan:    Hospital Outpatient Visit on 06/23/2022   Component Date Value Ref Range Status    Glucose, Ur 06/23/2022 NEGATIVE  NEGATIVE Final    Bilirubin Urine 06/23/2022 NEGATIVE  NEGATIVE Final    Ketones, Urine 06/23/2022 NEGATIVE  NEGATIVE Final    Specific Gravity, UA 06/23/2022 1.005* 1.010 - 1.025 Final    Urine Hgb 06/23/2022 TRACE* NEGATIVE Final    pH, UA 06/23/2022 5.5  5.0 - 6.0 Final    Protein, UA 06/23/2022 NEGATIVE  NEGATIVE Final    Urobilinogen, Urine 06/23/2022 Normal  Normal Final    Nitrite, Urine 06/23/2022 NEGATIVE  NEGATIVE Final    Leukocyte Esterase, Urine 06/23/2022 1+* NEGATIVE Final    - 06/23/2022        Final    WBC, UA 06/23/2022 0 TO 4  0 - 4 /HPF Final    RBC, UA 06/23/2022 0 TO 4  0 - 4 /HPF Final    Epithelial Cells UA 06/23/2022 0 TO 4  0 - 5 /HPF Final    Bacteria, UA 06/23/2022 None  None Final          Diagnosis Orders   1. Painful urination  Urinalysis with Reflex to Culture    Vaginitis DNA Probe    C.trachomatis N.gonorrhoeae DNA   2. Vaginal discharge  Vaginitis DNA Probe    C.trachomatis N.gonorrhoeae DNA     I reviewed UA result with patient during visit. We will call with lab results and treat if indicated. I recommended to follow up with GYN if symptoms persist or worsen. The use, risks, benefits, and side effects of prescribed or recommended medications were discussed. All questions were answered and the patient/caregiver voiced understanding. No orders of the defined types were placed in this encounter.         Electronically signed by LYNNE Luther CNP on 6/23/22 at 2:44 PM EDT

## 2022-06-23 NOTE — PATIENT INSTRUCTIONS
Patient Education        Painful Urination (Dysuria): Care Instructions  Your Care Instructions  Burning pain with urination (dysuria) is a common symptom of a urinary tract infection or other urinary problems. The bladder may become inflamed. This can cause pain when the bladder fills and empties. You may also feel pain if the tube that carries urine from the bladder to the outside of the body (urethra)gets irritated or infected. Sexually transmitted infections (STIs) also may cause pain when you urinate. Sometimes the pain can be caused by things other than an infection. The urethra can be irritated by soaps, perfumes, or foreign objects in the urethra. Jasmin Marcel can cause pain when they pass through the urethra. The cause may be hard to find. You may need tests. Treatment for painfulurination depends on the cause. Follow-up care is a key part of your treatment and safety. Be sure to make and go to all appointments, and call your doctor if you are having problems. It's also a good idea to know your test results and keep alist of the medicines you take. How can you care for yourself at home?  Drink extra water for the next day or two. This will help make the urine less concentrated. (If you have kidney, heart, or liver disease and have to limit fluids, talk with your doctor before you increase the amount of fluids you drink.)   Avoid drinks that are carbonated or have caffeine. They can irritate the bladder.  Urinate often. Try to empty your bladder each time. For women:   Urinate right after you have sex.  After going to the bathroom, wipe from front to back.  Avoid douches, bubble baths, and feminine hygiene sprays. And avoid other feminine hygiene products that have deodorants. When should you call for help? Call your doctor now or seek immediate medical care if:     You have new symptoms, such as fever, nausea, or vomiting.      You have new or worse symptoms of a urinary problem.  For example:  ? You have blood or pus in your urine. ? You have chills or body aches. ? It hurts worse to urinate. ? You have groin or belly pain. ? You have pain in your back just below your rib cage (the flank area). Watch closely for changes in your health, and be sure to contact your doctor ifyou have any problems. Where can you learn more? Go to https://[employer]Acceptd. org and sign in to your [employer] account. Enter V409 in the [employer] box to learn more about \"Painful Urination (Dysuria): Care Instructions. \"     If you do not have an account, please click on the \"Sign Up Now\" link. Current as of: October 18, 2021               Content Version: 13.3  © 4922-7073 Healthwise, Incorporated. Care instructions adapted under license by [facility] If you have questions about a medical condition or this instruction, always ask your healthcare professional. [place] [address] any warranty or liability for your use of this information.

## 2022-06-24 LAB
C TRACH DNA GENITAL QL NAA+PROBE: NEGATIVE
CANDIDA SPECIES, DNA PROBE: NEGATIVE
GARDNERELLA VAGINALIS, DNA PROBE: NEGATIVE
N. GONORRHOEAE DNA: NEGATIVE
SOURCE: NORMAL
SPECIMEN DESCRIPTION: NORMAL
TRICHOMONAS VAGINALIS DNA: NEGATIVE

## 2022-08-19 ENCOUNTER — OFFICE VISIT (OUTPATIENT)
Dept: PRIMARY CARE CLINIC | Age: 57
End: 2022-08-19
Payer: COMMERCIAL

## 2022-08-19 VITALS
HEART RATE: 72 BPM | BODY MASS INDEX: 19.29 KG/M2 | SYSTOLIC BLOOD PRESSURE: 90 MMHG | WEIGHT: 120 LBS | HEIGHT: 66 IN | TEMPERATURE: 98.1 F | DIASTOLIC BLOOD PRESSURE: 60 MMHG | OXYGEN SATURATION: 98 %

## 2022-08-19 DIAGNOSIS — U07.1 COVID-19: Primary | ICD-10-CM

## 2022-08-19 PROCEDURE — 99213 OFFICE O/P EST LOW 20 MIN: CPT | Performed by: FAMILY MEDICINE

## 2022-08-19 ASSESSMENT — ENCOUNTER SYMPTOMS
EYE DISCHARGE: 0
RHINORRHEA: 0
VOMITING: 0
SINUS PRESSURE: 0
TROUBLE SWALLOWING: 0
EYE REDNESS: 0
WHEEZING: 0
ABDOMINAL PAIN: 0
COUGH: 0
SHORTNESS OF BREATH: 0
SINUS PAIN: 0
DIARRHEA: 0
NAUSEA: 0
CONSTIPATION: 0
SORE THROAT: 0

## 2022-08-19 NOTE — LETTER
[facility] A department of [facility] [address]  Phone: [phone]  Fax: [phone] Iveth Zhang DO      August 19, 2022    Patient:   Quirino Mulligan  Date of Birth   1965  Date of visit   8/19/2022        To Whom it May Concern:      Quirino Mulligan was seen in my clinic on 8/19/2022. Please excuse from work 8/19/22-8/26/22 due to acute medical illness. May return to regular work duties 8/27/22 without restrictions. If you have any questions or concerns, please don't hesitate to call.       Sincerely,      Gray Bosworth, DO

## 2022-08-19 NOTE — PROGRESS NOTES
B-12 PO) Take by mouth daily  Yes Historical Provider, MD   clindamycin (CLEOCIN) 300 MG capsule Take 2 tabs 30 minutes prior to dental procedure. Then after dental extraction take 1 pill bid for 1 week  Patient not taking: No sig reported  Sharee Hart DO        Social History     Tobacco Use    Smoking status: Never    Smokeless tobacco: Never   Substance Use Topics    Alcohol use: No        Vitals:    08/19/22 1538   BP: 90/60   Site: Left Upper Arm   Position: Sitting   Cuff Size: Medium Adult   Pulse: 72   Temp: 98.1 °F (36.7 °C)   SpO2: 98%   Weight: 120 lb (54.4 kg)   Height: 5' 6\" (1.676 m)     Estimated body mass index is 19.37 kg/m² as calculated from the following:    Height as of this encounter: 5' 6\" (1.676 m). Weight as of this encounter: 120 lb (54.4 kg). Physical Exam  Vitals and nursing note reviewed. Constitutional:       General: She is not in acute distress. Appearance: Normal appearance. She is well-developed. She is not diaphoretic. HENT:      Head: Normocephalic and atraumatic. Right Ear: External ear normal.      Left Ear: External ear normal.      Ears:      Comments: TMs dull with fluid behind the TM     Nose: No congestion or rhinorrhea. Mouth/Throat:      Pharynx: No posterior oropharyngeal erythema. Comments: Post nasal drainage noted  Eyes:      General: No scleral icterus. Right eye: No discharge. Left eye: No discharge. Conjunctiva/sclera: Conjunctivae normal.      Pupils: Pupils are equal, round, and reactive to light. Neck:      Thyroid: No thyromegaly. Cardiovascular:      Rate and Rhythm: Normal rate and regular rhythm. Heart sounds: Normal heart sounds. Pulmonary:      Effort: Pulmonary effort is normal. No respiratory distress. Breath sounds: Normal breath sounds. No wheezing. Musculoskeletal:      Cervical back: Normal range of motion and neck supple.    Lymphadenopathy:      Cervical: No cervical adenopathy. Skin:     General: Skin is warm and dry. Findings: No rash. Neurological:      Mental Status: She is alert and oriented to person, place, and time. Psychiatric:         Behavior: Behavior normal.         Thought Content: Thought content normal.         Judgment: Judgment normal.       ASSESSMENT/PLAN:    Encounter Diagnosis   Name Primary? COVID-19 Yes     Continue with symptomatic treatment as needed. Increase fluids and rest    Tylenol/Motrin prn    Off work extended. Return  if no improvement in symptoms or if any further symptoms arise. No follow-ups on file. An electronic signature was used to authenticate this note.     --Jailyn Trimble,  on 8/19/2022 at 4:17 PM

## 2022-08-25 ENCOUNTER — TELEPHONE (OUTPATIENT)
Dept: FAMILY MEDICINE CLINIC | Age: 57
End: 2022-08-25

## 2022-08-25 RX ORDER — CEFDINIR 300 MG/1
300 CAPSULE ORAL 2 TIMES DAILY
Qty: 20 CAPSULE | Refills: 0 | Status: SHIPPED | OUTPATIENT
Start: 2022-08-25 | End: 2022-08-29

## 2022-08-25 NOTE — TELEPHONE ENCOUNTER
Any suggestions for her headaches? Should she be evaluated through UC? Our next opening is 9/2/22 (1), then 9/6/22.

## 2022-08-25 NOTE — TELEPHONE ENCOUNTER
Sounds like a possible secondary sinus infection. I will send in an antibiotic to see if this will help with her symptoms.

## 2022-08-25 NOTE — TELEPHONE ENCOUNTER
Patient calling requesting appt to discuss fatigue and other sx post covid. Patient states she has tested negative, but she is still experiencing extreme fatigue, headaches, and \"green as grass\" mucus when blowing her nose. She states the headaches come on between 9pm and 11:30pm, and they can last 2+ hours. She has tried taking Advil PM and can get to sleep, but she is expected to return to work soon, and she is worried about working 3rd shift while experiencing these headaches and extreme fatigue. She states that the headaches are so bad that she must lay down, and she would rate them about a 5-6 on the pain scale. Advised there are no appts at this time, so she is requesting nurse to call to discuss. Please advise.

## 2022-08-25 NOTE — LETTER
[facility] department of [place] [address]  Phone: [phone]  Fax: Mick Macias DO      August 25, 2022    Patient:   Alida Orantes  Date of Birth   1965  Date of visit   8/25/2022        To Whom it May Concern:      Alida Orantes was seen in my clinic on 8/19/2022. Please excuse from work through 8/28/2022. This is an extension from previous work note. May return to work without restriction on 8/29/2022. If you have any questions or concerns, please don't hesitate to call.       Sincerely,      Cornelius Suresh DO

## 2022-08-25 NOTE — TELEPHONE ENCOUNTER
Patient notified. She is requesting extension of work note. Per Dr Bertina Bence, may extend it through the weekend. If patient is not feeling better she should then be re-evaluated. Advised patient. New work note printed.

## 2022-08-29 ENCOUNTER — OFFICE VISIT (OUTPATIENT)
Dept: PRIMARY CARE CLINIC | Age: 57
End: 2022-08-29
Payer: COMMERCIAL

## 2022-08-29 VITALS
SYSTOLIC BLOOD PRESSURE: 108 MMHG | WEIGHT: 121 LBS | RESPIRATION RATE: 20 BRPM | DIASTOLIC BLOOD PRESSURE: 64 MMHG | OXYGEN SATURATION: 97 % | TEMPERATURE: 98.4 F | BODY MASS INDEX: 19.44 KG/M2 | HEIGHT: 66 IN | HEART RATE: 67 BPM

## 2022-08-29 DIAGNOSIS — G44.209 ACUTE NON INTRACTABLE TENSION-TYPE HEADACHE: Primary | ICD-10-CM

## 2022-08-29 PROCEDURE — 99213 OFFICE O/P EST LOW 20 MIN: CPT | Performed by: FAMILY MEDICINE

## 2022-08-29 RX ORDER — PREDNISONE 20 MG/1
40 TABLET ORAL DAILY
Qty: 10 TABLET | Refills: 0 | Status: SHIPPED | OUTPATIENT
Start: 2022-08-29 | End: 2022-09-03

## 2022-08-29 ASSESSMENT — ENCOUNTER SYMPTOMS
RESPIRATORY NEGATIVE: 1
GASTROINTESTINAL NEGATIVE: 1
EYES NEGATIVE: 1
ALLERGIC/IMMUNOLOGIC NEGATIVE: 1

## 2022-08-29 NOTE — PROGRESS NOTES
Subjective:      Patient ID: Nela Perez is a 59 y.o. female. HPI  acute urgent care visit for ongoing headache following covid illness. Started 8/14, with testing positive on the 16th. Symptoms have resolved except for the headaches. Supposed to be going back to work but the headache is debilitating. Bitemporal/frontal in location. Comes and goes in intensity. Using ibuprofen and advil pm.  Tried tylenol, anais barlow on the neck. Seemed better over the weekend but came on again late Sunday night. Still there today on Monday. She was supposed to go back to work today but the headache is too much for her to comfortably work/concentrate. Past Medical History:   Diagnosis Date    Allergic rhinitis     Probable. Anxiety     Fibromyalgia     GERD (gastroesophageal reflux disease)     Usually induced by stress. Migraines     Mitral valve prolapse     Seasonal allergies      Past Surgical History:   Procedure Laterality Date    CHOLECYSTECTOMY      HYSTERECTOMY (CERVIX STATUS UNKNOWN)      Still has bilateral ovaries intact. Current Outpatient Medications   Medication Sig Dispense Refill    Multiple Vitamins-Minerals (THRIVE FOR LIFE WOMENS PO) Take by mouth      Cyanocobalamin (VITAMIN B-12 PO) Take by mouth daily       clindamycin (CLEOCIN) 300 MG capsule Take 2 tabs 30 minutes prior to dental procedure. Then after dental extraction take 1 pill bid for 1 week (Patient not taking: No sig reported) 18 capsule 0     No current facility-administered medications for this visit. Allergies   Allergen Reactions    Aciphex [Rabeprazole Sodium]     Biaxin [Clarithromycin]     Buspirone     Nizatidine      Caused palpitations and near syncope. Sudafed [Pseudoephedrine]      Severe palpitations. Sumatriptan     Amoxicillin Rash    Amoxicillin-Pot Clavulanate Rash    Cefdinir Rash    Ciprofloxacin Nausea And Vomiting         Review of Systems   Constitutional: Negative. HENT: Negative.      Eyes: Negative. Respiratory: Negative. Cardiovascular: Negative. Gastrointestinal: Negative. Endocrine: Negative. Genitourinary: Negative. Musculoskeletal: Negative. Skin: Negative. Allergic/Immunologic: Negative. Neurological:  Positive for headaches. Hematological: Negative. Psychiatric/Behavioral: Negative. Objective:   Physical Exam  Constitutional:       General: She is not in acute distress. Appearance: She is well-developed. HENT:      Head: Normocephalic and atraumatic. Right Ear: External ear normal.      Left Ear: External ear normal.      Mouth/Throat:      Pharynx: No oropharyngeal exudate. Eyes:      General: No scleral icterus. Conjunctiva/sclera: Conjunctivae normal.   Neck:      Thyroid: No thyromegaly. Cardiovascular:      Rate and Rhythm: Normal rate and regular rhythm. Heart sounds: Normal heart sounds. No murmur heard. Pulmonary:      Effort: Pulmonary effort is normal. No respiratory distress. Breath sounds: Normal breath sounds. No wheezing. Abdominal:      General: Bowel sounds are normal. There is no distension. Palpations: Abdomen is soft. Tenderness: There is no abdominal tenderness. There is no rebound. Musculoskeletal:         General: No tenderness. Normal range of motion. Cervical back: Neck supple. Skin:     General: Skin is warm and dry. Findings: No erythema or rash. Neurological:      Mental Status: She is alert and oriented to person, place, and time. Cranial Nerves: Cranial nerves are intact. Sensory: Sensation is intact. Motor: Motor function is intact. Psychiatric:         Behavior: Behavior normal.         Thought Content:  Thought content normal.         Judgment: Judgment normal.     /64 (Site: Left Upper Arm, Position: Sitting, Cuff Size: Medium Adult)   Pulse 67   Temp 98.4 °F (36.9 °C) (Tympanic)   Resp 20   Ht 5' 6\" (1.676 m)   Wt 121 lb (54.9 kg)   LMP 07/10/1984 (Exact Date)   SpO2 97%   BMI 19.53 kg/m²     Assessment:      Cephalgia following covid illness. Minimal response to ibuprofen      Plan:      Prednisone 40mg/day x 5 days. Follow up prn persistent concerns. Work note.           Jackson Whalen MD

## 2022-08-29 NOTE — LETTER
[address] [facility] A department of [facility] 99  Phone: [phone]  Fax: [phone]        Luisito Diaz MD      August 29, 2022    Patient:   Jade Meneses  Date of Birth   1965  Date of visit   8/29/2022        To Whom it May Concern:      Jade Meneses was seen in my clinic on 8/29/2022. Please excuse from work today thru 09/04/2022. She may return to work Monday 9/05/2022      If you have any questions or concerns, please don't hesitate to call.       Sincerely,      Luisito Diaz MD/bn

## 2022-08-30 ENCOUNTER — TELEPHONE (OUTPATIENT)
Dept: PRIMARY CARE CLINIC | Age: 57
End: 2022-08-30

## 2022-08-30 NOTE — TELEPHONE ENCOUNTER
Pt called requesting an update about her [facility] paperwork. I called Dr. Urvashi Smith nurse Jojo Lo and she stated that she believed the paperwork had been sent to the insurance office. I then called the insurance to make sure everything was sent. Had confirmation from [employer] that everything is being processed. Contacted the patient again to tell her that everything was moving forward and she should hear something soon.  Pt voiced understanding

## 2022-09-06 ENCOUNTER — HOSPITAL ENCOUNTER (OUTPATIENT)
Dept: GENERAL RADIOLOGY | Age: 57
Discharge: HOME | End: 2022-09-08
Payer: COMMERCIAL

## 2022-09-06 ENCOUNTER — OFFICE VISIT (OUTPATIENT)
Dept: PRIMARY CARE CLINIC | Age: 57
End: 2022-09-06
Payer: COMMERCIAL

## 2022-09-06 ENCOUNTER — TELEPHONE (OUTPATIENT)
Dept: PRIMARY CARE CLINIC | Age: 57
End: 2022-09-06

## 2022-09-06 VITALS
DIASTOLIC BLOOD PRESSURE: 64 MMHG | HEART RATE: 74 BPM | TEMPERATURE: 98.3 F | OXYGEN SATURATION: 98 % | SYSTOLIC BLOOD PRESSURE: 110 MMHG

## 2022-09-06 DIAGNOSIS — M25.572 ACUTE LEFT ANKLE PAIN: Primary | ICD-10-CM

## 2022-09-06 DIAGNOSIS — M25.572 ACUTE LEFT ANKLE PAIN: ICD-10-CM

## 2022-09-06 DIAGNOSIS — S92.255A CLOSED NONDISPLACED FRACTURE OF NAVICULAR BONE OF LEFT FOOT, INITIAL ENCOUNTER: ICD-10-CM

## 2022-09-06 PROCEDURE — 73610 X-RAY EXAM OF ANKLE: CPT

## 2022-09-06 PROCEDURE — 99213 OFFICE O/P EST LOW 20 MIN: CPT | Performed by: NURSE PRACTITIONER

## 2022-09-06 PROCEDURE — L4361 PNEUMA/VAC WALK BOOT PRE OTS: HCPCS | Performed by: NURSE PRACTITIONER

## 2022-09-06 ASSESSMENT — ENCOUNTER SYMPTOMS
CHEST TIGHTNESS: 0
CONSTIPATION: 0
SHORTNESS OF BREATH: 0
ABDOMINAL PAIN: 0
COUGH: 0
WHEEZING: 0
DIARRHEA: 0
BACK PAIN: 0

## 2022-09-06 NOTE — LETTER
[facility] A department of [facility]  Phone: [phone]  Fax: [phone]    LYNNE Harrington CNP        September 6, 2022     Patient: Eloisa Torres   YOB: 1965   Date of Visit: 9/6/2022       To Whom It May Concern: It is my medical opinion that Eloisa Torres should remain out of work until seen and evaluated by orthopedic specialist. .    If you have any questions or concerns, please don't hesitate to call.     Sincerely,        LYNNE Harrington CNP

## 2022-09-06 NOTE — LETTER
[address] [facility] A department of [place] [address]  Phone: [phone]  Fax: [phone]        LYNNE Garces CNP      September 6, 2022    Patient:   Nadine Lange  Date of Birth   1965  Date of visit   9/6/2022        To Whom it May Concern:      Nadine Lange was seen in my clinic on 9/6/2022 unable to work till seen by orthopedics for evaluation of fracture. Patient at this time unable to walk on foot, no weight bearing till 9/13/2022. If you have any questions or concerns, please don't hesitate to call.       Sincerely,

## 2022-09-06 NOTE — PROGRESS NOTES
[facility] A department of [facility] 99  Phone: [phone]  Fax: [phone]      Marcelo Rea is a 59 y.o. female who presents to the [facility] today for her medical conditions/complaints as noted below. Marcelo Rea is c/o of Post-COVID Symptoms (Needs rtw note had covid 8/14/2022) and Ankle Pain (Left ankle injury twisted 3 days ago )          HPI:     Ankle Pain   The incident occurred 3 to 5 days ago (9/3/2022). The incident occurred at home. The injury mechanism was an inversion injury (Was walking along a hill and did not see a divit and twisted ankle. ). The pain is present in the left ankle. The quality of the pain is described as aching and burning. The pain is at a severity of 4/10 (yesterday was 1.). The pain is moderate. The pain has been Fluctuating since onset. Associated symptoms include tingling (toes feel tingling. ). Pertinent negatives include no inability to bear weight, loss of motion, loss of sensation or muscle weakness. The symptoms are aggravated by movement. She has tried NSAIDs for the symptoms. The treatment provided mild relief. Past Medical History:   Diagnosis Date    Allergic rhinitis     Probable. Anxiety     Fibromyalgia     GERD (gastroesophageal reflux disease)     Usually induced by stress. Migraines     Mitral valve prolapse     Seasonal allergies         Allergies   Allergen Reactions    Aciphex [Rabeprazole Sodium]     Biaxin [Clarithromycin]     Buspirone     Nizatidine      Caused palpitations and near syncope. Sudafed [Pseudoephedrine]      Severe palpitations.     Sumatriptan     Amoxicillin Rash    Amoxicillin-Pot Clavulanate Rash    Cefdinir Rash    Ciprofloxacin Nausea And Vomiting       Wt Readings from Last 3 Encounters:   08/29/22 121 lb (54.9 kg)   08/19/22 120 lb (54.4 kg)   06/23/22 120 lb (54.4 kg)     BP Readings from Last 3 Encounters:   09/06/22 110/64 08/29/22 108/64   08/19/22 90/60      Temp Readings from Last 3 Encounters:   09/06/22 98.3 °F (36.8 °C) (Tympanic)   08/29/22 98.4 °F (36.9 °C) (Tympanic)   08/19/22 98.1 °F (36.7 °C)     Pulse Readings from Last 3 Encounters:   09/06/22 74   08/29/22 67   08/19/22 72     SpO2 Readings from Last 3 Encounters:   09/06/22 98%   08/29/22 97%   08/19/22 98%       Subjective:      Review of Systems   Constitutional:  Negative for activity change, appetite change, chills, fatigue and fever. HENT:  Negative for sneezing. Eyes:  Negative for visual disturbance. Respiratory:  Negative for cough, chest tightness, shortness of breath and wheezing. Cardiovascular:  Negative for chest pain, palpitations and leg swelling. Gastrointestinal:  Negative for abdominal pain, constipation and diarrhea. Endocrine: Negative for polydipsia, polyphagia and polyuria. Genitourinary:  Negative for difficulty urinating. Musculoskeletal:  Positive for gait problem (due to pain in left ankle.) and joint swelling (left ankle. ). Negative for back pain and myalgias. Skin:  Negative for rash. Allergic/Immunologic: Negative for environmental allergies. Neurological:  Positive for tingling (toes feel tingling. ). Negative for dizziness, syncope, weakness, light-headedness and headaches. Psychiatric/Behavioral:  Negative for dysphoric mood. All other systems reviewed and are negative. Objective:     Vitals:    09/06/22 1245   BP: 110/64   Site: Left Upper Arm   Position: Sitting   Cuff Size: Large Adult   Pulse: 74   Temp: 98.3 °F (36.8 °C)   TempSrc: Tympanic   SpO2: 98%     There is no height or weight on file to calculate BMI. /64 (Site: Left Upper Arm, Position: Sitting, Cuff Size: Large Adult)   Pulse 74   Temp 98.3 °F (36.8 °C) (Tympanic)   LMP 07/10/1984 (Exact Date)   SpO2 98%   Physical Exam  Vitals and nursing note reviewed. Constitutional:       General: She is not in acute distress. Appearance: She is well-developed. HENT:      Nose: Nose normal.      Mouth/Throat:      Mouth: Mucous membranes are moist.   Eyes:      Conjunctiva/sclera: Conjunctivae normal.      Pupils: Pupils are equal, round, and reactive to light. Neck:      Thyroid: No thyromegaly. Cardiovascular:      Rate and Rhythm: Normal rate and regular rhythm. Pulses: Normal pulses. Heart sounds: Normal heart sounds. No murmur heard. Pulmonary:      Effort: Pulmonary effort is normal. No respiratory distress. Breath sounds: Normal breath sounds. No wheezing or rales. Abdominal:      Palpations: Abdomen is soft. Musculoskeletal:         General: Swelling, tenderness and signs of injury present. Cervical back: Normal range of motion and neck supple. Left ankle: Swelling present. Tenderness present. No medial malleolus or base of 5th metatarsal tenderness. Normal range of motion. Legs:       Comments: Left ankle movement intact but with discomfort. Pain along the lateral aspect of malleolus. Discomfort to the top portion of foot also. Lymphadenopathy:      Cervical: No cervical adenopathy. Skin:     General: Skin is warm and dry. Capillary Refill: Capillary refill takes less than 2 seconds. Findings: No rash. Neurological:      General: No focal deficit present. Mental Status: She is alert and oriented to person, place, and time. Mental status is at baseline. Psychiatric:         Mood and Affect: Mood normal.         Behavior: Behavior normal.         Judgment: Judgment normal.       Assessment:      Diagnosis Orders   1. Acute left ankle pain  XR ANKLE LEFT (MIN 3 VIEWS)    Glen Abdul MD, Orthopedic Surgery, [place]    Pneuma/vac walk boot pre ots      2.  Closed nondisplaced fracture of navicular bone of left foot, initial encounter  Glen Abdul MD, Orthopedic Surgery, [place]    Pneuma/vac walk boot pre ots          XR ANKLE LEFT (MIN 3 VIEWS)  Narrative: EXAMINATION:  THREE XRAY VIEWS OF THE LEFT ANKLE    9/6/2022 12:59 pm    COMPARISON:  None. HISTORY:  ORDERING SYSTEM PROVIDED HISTORY: Acute left ankle pain  TECHNOLOGIST PROVIDED HISTORY:  twisted ankle 3 days ago, still painful  Reason for Exam: C/o pain anterior/lateral/medial after twisting injury    57-year-old female with acute left ankle pain after twisting injury    FINDINGS:  Ankle mortise is intact. Osseous alignment is normal.  Joint spaces are well  maintained. Lucency in the dorsal navicular lucency in the dorsal navicular  which could represent nondisplaced fracture. No dislocation. Small to moderate tibiotalar joint effusion. No tibiotalar joint effusion is  identified. Boehler's angle is maintained. Impression: 1. Small to moderate tibiotalar joint effusion. 2. Equivocal nondisplaced fracture at the dorsal navicular. Consider further  evaluation with MRI or CT if there is point tenderness in this region. Plan:   Reviewed X rays with patient. Will place in ortho boot and have her follow up with Ortho. Work note given,. Procedures    Pneuma/vac walk boot pre ots     Patient was prescribed a Procare Nexstep Shortie Air Walker [employer]). The left ankle navicular bone will require stabilization / immobilization from this semi-rigid / rigid orthosis to improve their function. The orthosis will assist in protecting the affected area, provide functional support and facilitate healing. The patient was educated and fit by a healthcare professional with expert knowledge and specialization in brace application while under the direct supervision of the physician. Verbal and written instructions for the use of and application of this item were provided. They were instructed to contact the office immediately should the brace result in increased pain, decreased sensation, increased swelling or worsening of the condition.          Discussed exam, POCT findings, plan

## 2022-09-06 NOTE — TELEPHONE ENCOUNTER
Needs clarification on work note, work will not accept being off they want to know if she can work wearing boot. Work note printed that says no work no weight bearing till 9/13/2022 and evaluated by ortho.  Pt will  note

## 2022-09-07 DIAGNOSIS — S92.302A CLOSED NONDISPLACED FRACTURE OF METATARSAL BONE OF LEFT FOOT, UNSPECIFIED METATARSAL, INITIAL ENCOUNTER: Primary | ICD-10-CM

## 2022-09-13 ENCOUNTER — OFFICE VISIT (OUTPATIENT)
Dept: FAMILY MEDICINE CLINIC | Age: 57
End: 2022-09-13
Payer: COMMERCIAL

## 2022-09-13 ENCOUNTER — HOSPITAL ENCOUNTER (OUTPATIENT)
Dept: GENERAL RADIOLOGY | Age: 57
Discharge: HOME | End: 2022-09-15
Payer: COMMERCIAL

## 2022-09-13 ENCOUNTER — OFFICE VISIT (OUTPATIENT)
Dept: ORTHOPEDIC SURGERY | Age: 57
End: 2022-09-13
Payer: COMMERCIAL

## 2022-09-13 VITALS
HEART RATE: 97 BPM | OXYGEN SATURATION: 99 % | HEIGHT: 66 IN | SYSTOLIC BLOOD PRESSURE: 118 MMHG | WEIGHT: 119 LBS | DIASTOLIC BLOOD PRESSURE: 80 MMHG | TEMPERATURE: 99.8 F | BODY MASS INDEX: 19.13 KG/M2

## 2022-09-13 VITALS
DIASTOLIC BLOOD PRESSURE: 74 MMHG | HEIGHT: 66 IN | BODY MASS INDEX: 19.44 KG/M2 | WEIGHT: 121 LBS | SYSTOLIC BLOOD PRESSURE: 115 MMHG | HEART RATE: 89 BPM

## 2022-09-13 DIAGNOSIS — K04.7 DENTAL INFECTION: Primary | ICD-10-CM

## 2022-09-13 DIAGNOSIS — S92.902A CLOSED FRACTURE OF LEFT FOOT, INITIAL ENCOUNTER: Primary | ICD-10-CM

## 2022-09-13 DIAGNOSIS — S92.302A CLOSED NONDISPLACED FRACTURE OF METATARSAL BONE OF LEFT FOOT, UNSPECIFIED METATARSAL, INITIAL ENCOUNTER: ICD-10-CM

## 2022-09-13 PROCEDURE — 28470 CLTX METATARSAL FX WO MNP EA: CPT | Performed by: NURSE PRACTITIONER

## 2022-09-13 PROCEDURE — 99999 PR OFFICE/OUTPT VISIT,PROCEDURE ONLY: CPT | Performed by: NURSE PRACTITIONER

## 2022-09-13 PROCEDURE — 99213 OFFICE O/P EST LOW 20 MIN: CPT | Performed by: STUDENT IN AN ORGANIZED HEALTH CARE EDUCATION/TRAINING PROGRAM

## 2022-09-13 PROCEDURE — 73630 X-RAY EXAM OF FOOT: CPT

## 2022-09-13 RX ORDER — CLINDAMYCIN HYDROCHLORIDE 300 MG/1
300 CAPSULE ORAL 3 TIMES DAILY
Qty: 21 CAPSULE | Refills: 0 | Status: SHIPPED | OUTPATIENT
Start: 2022-09-13 | End: 2022-09-20

## 2022-09-13 ASSESSMENT — PATIENT HEALTH QUESTIONNAIRE - PHQ9
2. FEELING DOWN, DEPRESSED OR HOPELESS: 0
SUM OF ALL RESPONSES TO PHQ QUESTIONS 1-9: 0
SUM OF ALL RESPONSES TO PHQ QUESTIONS 1-9: 0
1. LITTLE INTEREST OR PLEASURE IN DOING THINGS: 0
SUM OF ALL RESPONSES TO PHQ QUESTIONS 1-9: 0
SUM OF ALL RESPONSES TO PHQ9 QUESTIONS 1 & 2: 0
SUM OF ALL RESPONSES TO PHQ QUESTIONS 1-9: 0

## 2022-09-13 NOTE — PROGRESS NOTES
Orthopaedic Progress Note      CHIEF COMPLAINT: Left foot fracture    HISTORY OF PRESENT ILLNESS:       Ms. Ana Noonan  is a 59 y.o. female  who presents with a left foot fracture. Patient states that on September 3 she was walking down a hill in the grass where leads were covering a divot. She states she twisted her foot and ankle. She states after she fell she was unable to put weight on this. She had x-rays taken which does show a fracture off of her left foot. She was given a walking boot is using a rollabout scooter. She has no swelling to the foot. She denies numbness or tingling. 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 She has been taking over-the-counter pain medicine as needed. Her fracture is noted over the midfoot region. Past Medical History:    Past Medical History:   Diagnosis Date    Allergic rhinitis     Probable. Anxiety     Fibromyalgia     GERD (gastroesophageal reflux disease)     Usually induced by stress. Migraines     Mitral valve prolapse     Seasonal allergies        Past Surgical History:    Past Surgical History:   Procedure Laterality Date    CHOLECYSTECTOMY      HYSTERECTOMY (CERVIX STATUS UNKNOWN)      Still has bilateral ovaries intact. Current  Medications:  Current Outpatient Medications   Medication Sig Dispense Refill    Multiple Vitamins-Minerals (THRIVE FOR LIFE WOMENS PO) Take by mouth      Cyanocobalamin (VITAMIN B-12 PO) Take by mouth daily       clindamycin (CLEOCIN) 300 MG capsule Take 2 tabs 30 minutes prior to dental procedure. Then after dental extraction take 1 pill bid for 1 week (Patient not taking: No sig reported) 18 capsule 0     No current facility-administered medications for this visit.        Allergies:  Aciphex [rabeprazole sodium], Biaxin [clarithromycin], Buspirone, Nizatidine, Sudafed [pseudoephedrine], Sumatriptan, Amoxicillin, Amoxicillin-pot clavulanate, Cefdinir, and Ciprofloxacin    Social History:   Social History     Tobacco Use   Smoking Status Never   Smokeless Tobacco Never     Social History     Substance and Sexual Activity   Alcohol Use No     Social History     Substance and Sexual Activity   Drug Use No       Family History:  Family History   Problem Relation Age of Onset    COPD Mother     Cancer Father        REVIEW OF SYSTEMS:  Constitutional: Denies any fever, chills. Musculoskeletal: Positive for left foot pain. PHYSICAL EXAM:  [unfilled]  General appearance:  Alert and oriented x 3. No apparent distress, appears stated age, calm and cooperative. Musculoskeletal: Left foot examined. Skin intact no rashes lesions or drainage. No redness warmth or cellulitis. No swelling noted. Toe flexion and extension is intact. Sensation intact neurovascularly intact to the left foot. Pain to palpation over the midfoot fracture region. Bruising around the left foot. Vi Scott DATA:  CBC:   Lab Results   Component Value Date/Time    WBC 4.7 03/01/2022 08:32 AM    HGB 13.1 03/01/2022 08:32 AM     03/01/2022 08:32 AM     BMP:    Lab Results   Component Value Date/Time     03/01/2022 08:32 AM    K 3.9 03/01/2022 08:32 AM     03/01/2022 08:32 AM    CO2 31 03/01/2022 08:32 AM    BUN 14 03/01/2022 08:32 AM    CREATININE 0.91 03/01/2022 08:32 AM    CALCIUM 9.9 03/01/2022 08:32 AM    GLUCOSE 94 03/01/2022 08:32 AM     PT/INR:  No results found for: PROTIME, INR  Troponin:  No results found for: TROPONINI  No results for input(s): LIPASE, AMYLASE in the last 72 hours. No results for input(s): AST, ALT, BILIDIR, BILITOT, ALKPHOS in the last 72 hours. Uric Acid:  No components found for: URIC  Urine Culture:  No components found for: CURINE    Radiology:   XR ANKLE LEFT (MIN 3 VIEWS)    Result Date: 9/6/2022  EXAMINATION: THREE XRAY VIEWS OF THE LEFT ANKLE 9/6/2022 12:59 pm COMPARISON: None.  HISTORY: ORDERING SYSTEM PROVIDED HISTORY: Acute left ankle pain

## 2022-09-13 NOTE — PROGRESS NOTES
[facility] [address] [place] [address]  Dept: [phone]  Dept Fax: [phone]  Loc: [phone]      Roya Becker is a 59 y.o. female who presents today for:  Chief Complaint   Patient presents with    Fever     Running a fever only in the middle or the night since last week, getting chills and sweating. Had a infected tooth and broke foot 9/3         Goals    None         HPI:     HPI  Patient is a 61-year-old female who presents the office today for 1 week of fevers- only at night between 10pm and 3am. Temp has been from 100 to 102.7. States that she is also had associated sweats with this issue. She states that she recently fractured her foot and has been following with orthopedics for this issue. This was not an open fracture and she does not have any overlying skin changes in the area. She currently is wearing a walking boot for this issue. She also states for around the same time she has had issues with a possible tooth infection on the left lower side of her mouth. She states that she is due for root canal on the side and is not sure if this is currently infected or not. She denies any cough, sinusitis symptoms, earache, urinary symptoms, nausea vomiting or diarrhea. Past Medical History:   Diagnosis Date    Allergic rhinitis     Probable. Anxiety     Fibromyalgia     GERD (gastroesophageal reflux disease)     Usually induced by stress. Migraines     Mitral valve prolapse     Seasonal allergies       Past Surgical History:   Procedure Laterality Date    CHOLECYSTECTOMY      HYSTERECTOMY (CERVIX STATUS UNKNOWN)      Still has bilateral ovaries intact.      Family History   Problem Relation Age of Onset    COPD Mother     Cancer Father      Social History     Tobacco Use    Smoking status: Never    Smokeless tobacco: Never   Substance Use Topics    Alcohol use: No      Current Outpatient Medications   Medication Sig Dispense Refill    clindamycin (CLEOCIN) 300 MG capsule Take 1 capsule by mouth 3 times daily for 7 days 21 capsule 0    Multiple Vitamins-Minerals (THRIVE FOR LIFE WOMENS PO) Take by mouth      Cyanocobalamin (VITAMIN B-12 PO) Take by mouth daily        No current facility-administered medications for this visit. Allergies   Allergen Reactions    Aciphex [Rabeprazole Sodium]     Biaxin [Clarithromycin]     Buspirone     Nizatidine      Caused palpitations and near syncope. Sudafed [Pseudoephedrine]      Severe palpitations. Sumatriptan     Amoxicillin Rash    Amoxicillin-Pot Clavulanate Rash    Cefdinir Rash    Ciprofloxacin Nausea And Vomiting       Health Maintenance   Topic Date Due    HIV screen  Never done    Hepatitis C screen  Never done    DTaP/Tdap/Td vaccine (1 - Tdap) Never done    Lipids  Never done    Colorectal Cancer Screen  Never done    Shingles vaccine (1 of 2) Never done    Breast cancer screen  04/09/2017    COVID-19 Vaccine (2 - Pfizer series) 01/13/2022    Flu vaccine (1) 09/01/2022    Depression Screen  09/13/2023    Hepatitis A vaccine  Aged Out    Hepatitis B vaccine  Aged Out    Hib vaccine  Aged Out    Meningococcal (ACWY) vaccine  Aged Out    Pneumococcal 0-64 years Vaccine  Aged Out       Subjective:      Review of Systems   Constitutional:  Positive for fever. Negative for chills and fatigue. HENT:  Negative for ear pain, postnasal drip and trouble swallowing. Eyes:  Negative for pain and visual disturbance. Respiratory:  Negative for cough and shortness of breath. Cardiovascular:  Negative for chest pain and palpitations. Gastrointestinal:  Negative for abdominal pain, blood in stool, constipation, diarrhea, nausea and vomiting. Genitourinary:  Negative for dysuria and urgency. Skin:  Negative for rash and wound. Neurological:  Negative for dizziness and headaches. Psychiatric/Behavioral:  Negative for dysphoric mood.  The patient is not nervous/anxious. Objective:     Vitals:    09/13/22 1450   BP: 118/80   Pulse: 97   Temp: 99.8 °F (37.7 °C)   SpO2: 99%   Weight: 119 lb (54 kg)   Height: 5' 6\" (1.676 m)       Body mass index is 19.21 kg/m². Wt Readings from Last 3 Encounters:   09/13/22 119 lb (54 kg)   09/13/22 121 lb (54.9 kg)   08/29/22 121 lb (54.9 kg)     BP Readings from Last 3 Encounters:   09/13/22 118/80   09/13/22 115/74   09/06/22 110/64       Physical Exam  Vitals and nursing note reviewed. Constitutional:       General: She is not in acute distress. Appearance: She is well-developed. She is not diaphoretic. HENT:      Head: Normocephalic and atraumatic. Right Ear: External ear normal.      Left Ear: External ear normal.      Nose: Nose normal.      Mouth/Throat:      Dentition: Abnormal dentition. Gingival swelling and dental caries present. Eyes:      General: No scleral icterus. Right eye: No discharge. Left eye: No discharge. Conjunctiva/sclera: Conjunctivae normal.   Cardiovascular:      Rate and Rhythm: Normal rate and regular rhythm. Heart sounds: Normal heart sounds. No murmur heard. Pulmonary:      Effort: Pulmonary effort is normal.      Breath sounds: Normal breath sounds. Musculoskeletal:      Cervical back: Normal range of motion. Skin:     General: Skin is warm and dry. Findings: No erythema or rash. Neurological:      Mental Status: She is alert and oriented to person, place, and time. Cranial Nerves: No cranial nerve deficit. Psychiatric:         Behavior: Behavior normal.         Thought Content:  Thought content normal.         Judgment: Judgment normal.       Lab Results   Component Value Date    WBC 4.7 03/01/2022    HGB 13.1 03/01/2022    HCT 39.8 03/01/2022     03/01/2022    AST 23 03/01/2022     03/01/2022    K 3.9 03/01/2022     03/01/2022    CREATININE 0.91 (H) 03/01/2022    BUN 14 03/01/2022    CO2 31 03/01/2022 TSH 2.93 06/22/2017    LABGLOM >60 03/01/2022    CALCIUM 9.9 03/01/2022       Imaging Results:    XR ANKLE LEFT (MIN 3 VIEWS)    Result Date: 9/6/2022  EXAMINATION: THREE XRAY VIEWS OF THE LEFT ANKLE 9/6/2022 12:59 pm COMPARISON: None. HISTORY: ORDERING SYSTEM PROVIDED HISTORY: Acute left ankle pain TECHNOLOGIST PROVIDED HISTORY: twisted ankle 3 days ago, still painful Reason for Exam: C/o pain anterior/lateral/medial after twisting injury 68-year-old female with acute left ankle pain after twisting injury FINDINGS: Ankle mortise is intact. Osseous alignment is normal.  Joint spaces are well maintained. Lucency in the dorsal navicular lucency in the dorsal navicular which could represent nondisplaced fracture. No dislocation. Small to moderate tibiotalar joint effusion. No tibiotalar joint effusion is identified. Boehler's angle is maintained. 1. Small to moderate tibiotalar joint effusion. 2. Equivocal nondisplaced fracture at the dorsal navicular. Consider further evaluation with MRI or CT if there is point tenderness in this region. Assessment/Plan:     Darrius Gaffney was seen today for fever. Diagnoses and all orders for this visit:    Dental infection  -     clindamycin (CLEOCIN) 300 MG capsule; Take 1 capsule by mouth 3 times daily for 7 days    Possible dental infection as patient has had some pain in there is erythema noted in this region. Will trial clindamycin 3 times daily for 7 days. Should her symptoms continue or fail to improve on the antibiotic could consider further evaluation with fever of unknown origin however given her symptoms we will treat as dental infection at this time. No concerning symptoms such as weight loss at this time. Most recent blood work reviewed and noted to be normal.  Would consider further evaluation if symptoms continue. No follow-ups on file.       Medications Prescribed:  Orders Placed This Encounter   Medications    clindamycin (CLEOCIN) 300 MG capsule     Sig: Take 1 capsule by mouth 3 times daily for 7 days     Dispense:  21 capsule     Refill:  0         Future Appointments   Date Time Provider Demond Anali   10/4/2022 10:30 AM Stu Lara MD [facility]       Patient given educational materials - see patient instructions. Discussed use, benefit, and sideeffects of prescribed medications. All patient questions answered. Pt voiced understanding. Reviewed health maintenance. Instructed to continue current medications, diet and exercise. Patient agreed with treatment plan. Follow up as directed.      Electronically signed by Arnie Jacobo DO on 9/14/2022 at 11:13 AM

## 2022-09-13 NOTE — LETTER
[facility] department of [place] [address]  Phone: [phone]  Fax: [phone]    LYNNE Gao [place]        September 13, 2022     Patient: Cecille Edmond   YOB: 1965   Date of Visit: 9/13/2022       To Whom It May Concern: It is my medical opinion that Cecille Edmond should remain out of work until 10/05/22. If you have any questions or concerns, please don't hesitate to call.     Sincerely,        LYNNE Gao CNP

## 2022-09-14 ASSESSMENT — ENCOUNTER SYMPTOMS
DIARRHEA: 0
VOMITING: 0
CONSTIPATION: 0
SHORTNESS OF BREATH: 0
BLOOD IN STOOL: 0
EYE PAIN: 0
ABDOMINAL PAIN: 0
NAUSEA: 0
TROUBLE SWALLOWING: 0
COUGH: 0

## 2022-09-28 DIAGNOSIS — S92.902A CLOSED FRACTURE OF LEFT FOOT, INITIAL ENCOUNTER: Primary | ICD-10-CM

## 2022-10-04 ENCOUNTER — HOSPITAL ENCOUNTER (OUTPATIENT)
Dept: GENERAL RADIOLOGY | Age: 57
Discharge: HOME | End: 2022-10-06
Payer: COMMERCIAL

## 2022-10-04 ENCOUNTER — OFFICE VISIT (OUTPATIENT)
Dept: ORTHOPEDIC SURGERY | Age: 57
End: 2022-10-04
Payer: COMMERCIAL

## 2022-10-04 VITALS
HEART RATE: 74 BPM | BODY MASS INDEX: 19.13 KG/M2 | HEIGHT: 66 IN | DIASTOLIC BLOOD PRESSURE: 64 MMHG | SYSTOLIC BLOOD PRESSURE: 118 MMHG | WEIGHT: 119 LBS

## 2022-10-04 DIAGNOSIS — S92.302A CLOSED NONDISPLACED FRACTURE OF METATARSAL BONE OF LEFT FOOT, UNSPECIFIED METATARSAL, INITIAL ENCOUNTER: Primary | ICD-10-CM

## 2022-10-04 DIAGNOSIS — S92.902A CLOSED FRACTURE OF LEFT FOOT, INITIAL ENCOUNTER: ICD-10-CM

## 2022-10-04 PROCEDURE — 73630 X-RAY EXAM OF FOOT: CPT

## 2022-10-04 PROCEDURE — 99024 POSTOP FOLLOW-UP VISIT: CPT | Performed by: PHYSICIAN ASSISTANT

## 2022-10-04 NOTE — LETTER
[facility] A department of [place] [address]  Phone: [phone]  Fax: [phone]    Nataliia Rock        October 4, 2022     Patient: Jennifer Cervantes   YOB: 1965   Date of Visit: 10/4/2022       To Whom It May Concern: It is my medical opinion that Jennifer Cervantes may return to work on 10/04/22. If you have any questions or concerns, please don't hesitate to call.     Sincerely,        Amy العلي PA-C

## 2022-10-04 NOTE — PROGRESS NOTES
Orthopaedic Progress Note      CHIEF COMPLAINT: Left navicular fracture foot    HISTORY OF PRESENT ILLNESS:       Ms. Yeyo Machado  is a 59 y.o. female  who presents with nondisplaced navicular fracture avulsion type. Patient is approximately 1 month out. Patient has not been wearing any type of boot or protective shoe. She is not appear to be having any pain or abnormality with gait. Past Medical History:    Past Medical History:   Diagnosis Date    Allergic rhinitis     Probable. Anxiety     Fibromyalgia     GERD (gastroesophageal reflux disease)     Usually induced by stress. Migraines     Mitral valve prolapse     Seasonal allergies        Past Surgical History:    Past Surgical History:   Procedure Laterality Date    CHOLECYSTECTOMY      HYSTERECTOMY (CERVIX STATUS UNKNOWN)      Still has bilateral ovaries intact. Current  Medications:  Current Outpatient Medications   Medication Sig Dispense Refill    Multiple Vitamins-Minerals (THR[employer] WOMENS PO) Take by mouth      Cyanocobalamin (VITAMIN B-12 PO) Take by mouth daily        No current facility-administered medications for this visit. Allergies:  Aciphex [rabeprazole sodium], Biaxin [clarithromycin], Buspirone, Nizatidine, Sudafed [pseudoephedrine], Sumatriptan, Amoxicillin, Amoxicillin-pot clavulanate, Cefdinir, and Ciprofloxacin    Social History:   Social History     Tobacco Use   Smoking Status Never   Smokeless Tobacco Never     Social History     Substance and Sexual Activity   Alcohol Use No     Social History     Substance and Sexual Activity   Drug Use No       Family History:  Family History   Problem Relation Age of Onset    COPD Mother     Cancer Father        REVIEW OF SYSTEMS:  Constitutional: Denies any fever, chills. Musculoskeletal: Positive for left navicular dorsal avulsion fracture. PHYSICAL EXAM:  [unfilled]  General appearance:  Alert and oriented x 3.  No apparent distress, appears stated age, calm and cooperative. Musculoskeletal: Full range of motion of the ankle full range of motion foot minimal pain to palpation about the dorsal aspect of the foot. Nubia Mullins DATA:  CBC:   Lab Results   Component Value Date/Time    WBC 4.7 03/01/2022 08:32 AM    HGB 13.1 03/01/2022 08:32 AM     03/01/2022 08:32 AM     BMP:    Lab Results   Component Value Date/Time     03/01/2022 08:32 AM    K 3.9 03/01/2022 08:32 AM     03/01/2022 08:32 AM    CO2 31 03/01/2022 08:32 AM    BUN 14 03/01/2022 08:32 AM    CREATININE 0.91 03/01/2022 08:32 AM    CALCIUM 9.9 03/01/2022 08:32 AM    GLUCOSE 94 03/01/2022 08:32 AM     PT/INR:  No results found for: PROTIME, INR  Troponin:  No results found for: TROPONINI  No results for input(s): LIPASE, AMYLASE in the last 72 hours. No results for input(s): AST, ALT, BILIDIR, BILITOT, ALKPHOS in the last 72 hours. Uric Acid:  No components found for: URIC  Urine Culture:  No components found for: CURINE    Radiology:   XR ANKLE LEFT (MIN 3 VIEWS)    Result Date: 9/6/2022  EXAMINATION: THREE XRAY VIEWS OF THE LEFT ANKLE 9/6/2022 12:59 pm COMPARISON: None. HISTORY: ORDERING SYSTEM PROVIDED HISTORY: Acute left ankle pain TECHNOLOGIST PROVIDED HISTORY: twisted ankle 3 days ago, still painful Reason for Exam: C/o pain anterior/lateral/medial after twisting injury 47-year-old female with acute left ankle pain after twisting injury FINDINGS: Ankle mortise is intact. Osseous alignment is normal.  Joint spaces are well maintained. Lucency in the dorsal navicular lucency in the dorsal navicular which could represent nondisplaced fracture. No dislocation. Small to moderate tibiotalar joint effusion. No tibiotalar joint effusion is identified. Boehler's angle is maintained. 1. Small to moderate tibiotalar joint effusion. 2. Equivocal nondisplaced fracture at the dorsal navicular. Consider further evaluation with MRI or CT if there is point tenderness in this region.      XR FOOT LEFT (MIN 3 VIEWS)    Result Date: 9/14/2022  EXAMINATION: THREE XRAY VIEWS OF THE LEFT FOOT 9/13/2022 10:37 am COMPARISON: September 5, 2022. HISTORY: ORDERING SYSTEM PROVIDED HISTORY: Closed nondisplaced fracture of metatarsal bone of left foot, unspecified metatarsal, initial encounter TECHNOLOGIST PROVIDED HISTORY: Reason for Exam: Lt foot fracture check up FINDINGS: No displaced fracture on today's study. Area of concern of dorsal navicula not demonstrated on this exam.  Soft tissues unremarkable. Joint spaces preserved. No acute findings. X-rays personally reviewed by me of nondisplaced navicular fracture avulsion 3 views left foot       Diagnosis Orders   1. Closed nondisplaced fracture of metatarsal bone of left foot, unspecified metatarsal, initial encounter              PLAN:  Weightbearing as tolerated return to work  Follow-up as needed  If patient is noted to have increasing pain follow-up and repeat x-rays left foot    No orders of the defined types were placed in this encounter.        Procedure:        Electronically signed by Dayo Archibald PA-C on 10/4/2022 at 10:55 AM

## 2024-02-19 ENCOUNTER — OFFICE VISIT (OUTPATIENT)
Dept: PRIMARY CARE CLINIC | Age: 59
End: 2024-02-19

## 2024-02-19 VITALS
BODY MASS INDEX: 19.21 KG/M2 | HEART RATE: 72 BPM | HEIGHT: 66 IN | TEMPERATURE: 98 F | DIASTOLIC BLOOD PRESSURE: 80 MMHG | OXYGEN SATURATION: 96 % | SYSTOLIC BLOOD PRESSURE: 110 MMHG

## 2024-02-19 DIAGNOSIS — J06.9 VIRAL URI: Primary | ICD-10-CM

## 2024-02-19 PROCEDURE — 99213 OFFICE O/P EST LOW 20 MIN: CPT | Performed by: NURSE PRACTITIONER

## 2024-02-19 RX ORDER — PREDNISONE 20 MG/1
40 TABLET ORAL DAILY
Qty: 10 TABLET | Refills: 0 | Status: SHIPPED | OUTPATIENT
Start: 2024-02-19 | End: 2024-02-24

## 2024-02-19 ASSESSMENT — ENCOUNTER SYMPTOMS
COLOR CHANGE: 0
SORE THROAT: 1
CHEST TIGHTNESS: 1
VOMITING: 0
DIARRHEA: 0
SINUS PRESSURE: 0
SINUS PAIN: 0
ABDOMINAL DISTENTION: 0
COUGH: 1
SHORTNESS OF BREATH: 0
WHEEZING: 0
RHINORRHEA: 0
CONSTIPATION: 0
NAUSEA: 0

## 2024-02-19 ASSESSMENT — PATIENT HEALTH QUESTIONNAIRE - PHQ9
1. LITTLE INTEREST OR PLEASURE IN DOING THINGS: 0
2. FEELING DOWN, DEPRESSED OR HOPELESS: 0
SUM OF ALL RESPONSES TO PHQ QUESTIONS 1-9: 0
SUM OF ALL RESPONSES TO PHQ9 QUESTIONS 1 & 2: 0

## 2024-02-19 NOTE — PROGRESS NOTES
[facility]  [address]  [facility] [address]  Dept: [phone]  Dept Fax: [phone]    Aimee De La Cruz is a 58 y.o. female who presents today for her medical conditions/complaintsas noted below.  Aimee De La Cruz is c/o of   Chief Complaint   Patient presents with    Cough     Yellow sputum covid test at work twice a week negative cough last 3 days no fever     HPI:     Patient presents to the walk in clinic for an acute evaluation. Normally a patient of Dr. Yoon. 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 Presents today due to not feeling well. Did test for COVID twice already both of which were negative. Denies known exposure to COVID or influenza.     Cough  This is a new problem. The current episode started in the past 7 days (x3 days). The problem has been unchanged. The problem occurs hourly. The cough is Productive of sputum (yellow sputum). Associated symptoms include a sore throat (with coughing). Pertinent negatives include no chest pain, chills, ear congestion, ear pain, fever, headaches, postnasal drip, rash, rhinorrhea, shortness of breath or wheezing. Nothing aggravates the symptoms. Treatments tried: Robitussin with mild relief.       No results found for: \"LABA1C\"          ( goal A1Cis < 7)   No components found for: \"LABMICR\"  No results found for: \"LDLCHOLESTEROL\", \"LDLCALC\"    (goal LDL is <100)   AST (U/L)   Date Value   03/01/2022 23     ALT (U/L)   Date Value   03/01/2022 16     BUN (mg/dL)   Date Value   03/01/2022 14     BP Readings from Last 3 Encounters:   02/19/24 110/80   10/04/22 118/64   09/13/22 118/80          (goal 120/80)    Past Medical History:   Diagnosis Date    Allergic rhinitis     Probable.    Anxiety     Fibromyalgia     GERD (gastroesophageal reflux disease)     Usually induced by stress.    Migraines     Mitral valve prolapse     Seasonal allergies

## 2024-10-10 ENCOUNTER — OFFICE VISIT (OUTPATIENT)
Dept: FAMILY MEDICINE CLINIC | Age: 59
End: 2024-10-10

## 2024-10-10 VITALS
TEMPERATURE: 97.7 F | BODY MASS INDEX: 19.21 KG/M2 | HEART RATE: 72 BPM | HEIGHT: 66 IN | SYSTOLIC BLOOD PRESSURE: 120 MMHG | OXYGEN SATURATION: 97 % | DIASTOLIC BLOOD PRESSURE: 78 MMHG | RESPIRATION RATE: 18 BRPM

## 2024-10-10 DIAGNOSIS — S61.452D DOG BITE OF LEFT HAND WITH INFECTION, SUBSEQUENT ENCOUNTER: ICD-10-CM
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
## 2024-10-10 DIAGNOSIS — L08.9 DOG BITE OF LEFT HAND WITH INFECTION, SUBSEQUENT ENCOUNTER: ICD-10-CM

## 2024-10-10 DIAGNOSIS — L08.9 DOG BITE OF RIGHT HAND WITH INFECTION, SUBSEQUENT ENCOUNTER: Primary | ICD-10-CM

## 2024-10-10 DIAGNOSIS — S61.451D DOG BITE OF RIGHT HAND WITH INFECTION, SUBSEQUENT ENCOUNTER: Primary | ICD-10-CM

## 2024-10-10 PROCEDURE — 99213 OFFICE O/P EST LOW 20 MIN: CPT

## 2024-10-10 PROCEDURE — 99213 OFFICE O/P EST LOW 20 MIN: CPT | Performed by: FAMILY MEDICINE

## 2024-10-10 RX ORDER — CLINDAMYCIN HCL 300 MG
300 CAPSULE ORAL 2 TIMES DAILY
COMMUNITY
End: 2024-10-10

## 2024-10-10 RX ORDER — CLINDAMYCIN HCL 300 MG
300 CAPSULE ORAL 3 TIMES DAILY
Qty: 30 CAPSULE | Refills: 0 | Status: SHIPPED | OUTPATIENT
Start: 2024-10-10 | End: 2024-10-20

## 2024-10-10 RX ORDER — DOXYCYCLINE HYCLATE 100 MG
100 TABLET ORAL 2 TIMES DAILY
COMMUNITY
Start: 2024-10-08 | End: 2024-10-10 | Stop reason: SINTOL

## 2024-10-10 RX ORDER — SULFAMETHOXAZOLE/TRIMETHOPRIM 800-160 MG
1 TABLET ORAL 2 TIMES DAILY
Qty: 20 TABLET | Refills: 0 | Status: SHIPPED | OUTPATIENT
Start: 2024-10-10 | End: 2024-10-20

## 2024-10-10 SDOH — ECONOMIC STABILITY: INCOME INSECURITY: HOW HARD IS IT FOR YOU TO PAY FOR THE VERY BASICS LIKE FOOD, HOUSING, MEDICAL CARE, AND HEATING?: NOT HARD AT ALL

## 2024-10-10 SDOH — ECONOMIC STABILITY: FOOD INSECURITY: WITHIN THE PAST 12 MONTHS, THE FOOD YOU BOUGHT JUST DIDN'T LAST AND YOU DIDN'T HAVE MONEY TO GET MORE.: NEVER TRUE

## 2024-10-10 SDOH — ECONOMIC STABILITY: FOOD INSECURITY: WITHIN THE PAST 12 MONTHS, YOU WORRIED THAT YOUR FOOD WOULD RUN OUT BEFORE YOU GOT MONEY TO BUY MORE.: NEVER TRUE

## 2024-10-10 NOTE — PROGRESS NOTES
10/10/2024     Aimee De La Cruz (:  1965) is a 59 y.o. female, here for evaluation of the following medical concerns:    HPI  Patient comes in today for ER follow-up from recent evaluation secondary to dog bite.  Patient was at home on  and her dog had been chewing on something that she thought was unsanitary so she was trying to pull him away from the item that he was chewing on and ultimately he got upset and started biting her hands.  She has several wounds to the hand and ultimately had went to the emergency room for further evaluation.  Upon evaluation in the ER they did not feel that they should be sutured due to the potential risk of causing worsening infection and so they did just place her on doxycycline.  She has been using antibiotic ointment on the wounds and keeping them covered but notes that when she started on the doxycycline it caused her blood pressure to elevate and so she did stop this medication and had some leftover clindamycin at home so she started on this.  She has only been taking the clindamycin twice a day.  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  She has not had any fever or chills.  Just primarily has pain to the areas of wounds.  No other acute issues at this time.    Did review patient's med list, allergies, social history,pmhx and pshx today as noted in the record.      Review of Systems   Constitutional:  Negative for chills, fatigue and fever.   Musculoskeletal:  Positive for arthralgias and joint swelling.   Skin:  Positive for color change and wound.       Prior to Visit Medications    Medication Sig Taking? Authorizing Provider   clindamycin (CLEOCIN) 300 MG capsule Take 1 capsule by mouth 3 times daily for

## 2024-10-15 ASSESSMENT — ENCOUNTER SYMPTOMS: COLOR CHANGE: 1
